# Patient Record
Sex: FEMALE | Race: WHITE | NOT HISPANIC OR LATINO | Employment: STUDENT | URBAN - METROPOLITAN AREA
[De-identification: names, ages, dates, MRNs, and addresses within clinical notes are randomized per-mention and may not be internally consistent; named-entity substitution may affect disease eponyms.]

---
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
## 2023-09-09 ENCOUNTER — HOSPITAL ENCOUNTER (EMERGENCY)
Facility: HOSPITAL | Age: 18
Discharge: PSYCHIATRIC OR SUBSTANCE USE FACILITY | End: 2023-09-10
Attending: FAMILY MEDICINE
Payer: MEDICAID

## 2023-09-09 DIAGNOSIS — U07.1 COVID: ICD-10-CM

## 2023-09-09 DIAGNOSIS — F41.0 PANIC ATTACK: Primary | ICD-10-CM

## 2023-09-09 LAB
ALBUMIN SERPL-MCNC: 4.8 G/DL (ref 3.5–5)
ALBUMIN/GLOB SERPL: 1.4 RATIO (ref 1.1–2)
ALP SERPL-CCNC: 97 UNIT/L (ref 40–150)
ALT SERPL-CCNC: 10 UNIT/L (ref 0–55)
AMPHET UR QL SCN: NEGATIVE
APAP SERPL-MCNC: <17.4 UG/ML (ref 17.4–30)
APPEARANCE UR: CLEAR
AST SERPL-CCNC: 20 UNIT/L (ref 5–34)
B-HCG SERPL QL: NEGATIVE
BACTERIA #/AREA URNS AUTO: ABNORMAL /HPF
BARBITURATE SCN PRESENT UR: NEGATIVE
BASOPHILS # BLD AUTO: 0.02 X10(3)/MCL
BASOPHILS NFR BLD AUTO: 0.5 %
BENZODIAZ UR QL SCN: NEGATIVE
BILIRUB SERPL-MCNC: 0.4 MG/DL
BILIRUB UR QL STRIP.AUTO: NEGATIVE
BUN SERPL-MCNC: 6.3 MG/DL (ref 7–18.7)
CALCIUM SERPL-MCNC: 10.1 MG/DL (ref 8.4–10.2)
CANNABINOIDS UR QL SCN: NEGATIVE
CHLORIDE SERPL-SCNC: 104 MMOL/L (ref 98–107)
CO2 SERPL-SCNC: 25 MMOL/L (ref 22–29)
COCAINE UR QL SCN: NEGATIVE
COLOR UR: YELLOW
CREAT SERPL-MCNC: 0.71 MG/DL (ref 0.55–1.02)
EOSINOPHIL # BLD AUTO: 0.01 X10(3)/MCL (ref 0–0.9)
EOSINOPHIL NFR BLD AUTO: 0.3 %
ERYTHROCYTE [DISTWIDTH] IN BLOOD BY AUTOMATED COUNT: 12.3 % (ref 11.5–17)
ETHANOL SERPL-MCNC: <10 MG/DL
FLUAV AG UPPER RESP QL IA.RAPID: NOT DETECTED
FLUBV AG UPPER RESP QL IA.RAPID: NOT DETECTED
GFR SERPLBLD CREATININE-BSD FMLA CKD-EPI: >60 MLS/MIN/1.73/M2
GLOBULIN SER-MCNC: 3.5 GM/DL (ref 2.4–3.5)
GLUCOSE SERPL-MCNC: 114 MG/DL (ref 74–100)
GLUCOSE UR QL STRIP.AUTO: NEGATIVE
HCT VFR BLD AUTO: 47.8 % (ref 37–47)
HGB BLD-MCNC: 15.4 G/DL (ref 12–16)
IMM GRANULOCYTES # BLD AUTO: 0 X10(3)/MCL (ref 0–0.04)
IMM GRANULOCYTES NFR BLD AUTO: 0 %
KETONES UR QL STRIP.AUTO: 15
LEUKOCYTE ESTERASE UR QL STRIP.AUTO: NEGATIVE
LYMPHOCYTES # BLD AUTO: 1.44 X10(3)/MCL (ref 0.6–4.6)
LYMPHOCYTES NFR BLD AUTO: 38.3 %
MCH RBC QN AUTO: 28.2 PG (ref 27–31)
MCHC RBC AUTO-ENTMCNC: 32.2 G/DL (ref 33–36)
MCV RBC AUTO: 87.5 FL (ref 80–94)
MONOCYTES # BLD AUTO: 0.46 X10(3)/MCL (ref 0.1–1.3)
MONOCYTES NFR BLD AUTO: 12.2 %
NEUTROPHILS # BLD AUTO: 1.83 X10(3)/MCL (ref 2.1–9.2)
NEUTROPHILS NFR BLD AUTO: 48.7 %
NITRITE UR QL STRIP.AUTO: NEGATIVE
OPIATES UR QL SCN: NEGATIVE
PCP UR QL: NEGATIVE
PH UR STRIP.AUTO: 7 [PH]
PH UR: 7 [PH] (ref 3–11)
PLATELET # BLD AUTO: 161 X10(3)/MCL (ref 130–400)
PMV BLD AUTO: 9.7 FL (ref 7.4–10.4)
POTASSIUM SERPL-SCNC: 3.6 MMOL/L (ref 3.5–5.1)
PROT SERPL-MCNC: 8.3 GM/DL (ref 6.4–8.3)
PROT UR QL STRIP.AUTO: NEGATIVE
RBC # BLD AUTO: 5.46 X10(6)/MCL (ref 4.2–5.4)
RBC #/AREA URNS AUTO: ABNORMAL /HPF
RBC UR QL AUTO: ABNORMAL
SALICYLATES SERPL-MCNC: <5 MG/DL (ref 15–30)
SARS-COV-2 RNA RESP QL NAA+PROBE: DETECTED
SODIUM SERPL-SCNC: 140 MMOL/L (ref 136–145)
SP GR UR STRIP.AUTO: 1.02 (ref 1–1.03)
SQUAMOUS #/AREA URNS AUTO: ABNORMAL /HPF
TSH SERPL-ACNC: 1.51 UIU/ML (ref 0.35–4.94)
UROBILINOGEN UR STRIP-ACNC: 4
WBC # SPEC AUTO: 3.76 X10(3)/MCL (ref 4.5–11.5)
WBC #/AREA URNS AUTO: ABNORMAL /HPF

## 2023-09-09 PROCEDURE — 81001 URINALYSIS AUTO W/SCOPE: CPT | Mod: 59 | Performed by: FAMILY MEDICINE

## 2023-09-09 PROCEDURE — 96372 THER/PROPH/DIAG INJ SC/IM: CPT | Performed by: FAMILY MEDICINE

## 2023-09-09 PROCEDURE — 82077 ASSAY SPEC XCP UR&BREATH IA: CPT | Performed by: FAMILY MEDICINE

## 2023-09-09 PROCEDURE — 96372 THER/PROPH/DIAG INJ SC/IM: CPT | Performed by: STUDENT IN AN ORGANIZED HEALTH CARE EDUCATION/TRAINING PROGRAM

## 2023-09-09 PROCEDURE — 99285 EMERGENCY DEPT VISIT HI MDM: CPT | Mod: 25

## 2023-09-09 PROCEDURE — 63600175 PHARM REV CODE 636 W HCPCS: Performed by: STUDENT IN AN ORGANIZED HEALTH CARE EDUCATION/TRAINING PROGRAM

## 2023-09-09 PROCEDURE — 80143 DRUG ASSAY ACETAMINOPHEN: CPT | Performed by: FAMILY MEDICINE

## 2023-09-09 PROCEDURE — 84443 ASSAY THYROID STIM HORMONE: CPT | Performed by: FAMILY MEDICINE

## 2023-09-09 PROCEDURE — 80053 COMPREHEN METABOLIC PANEL: CPT | Performed by: FAMILY MEDICINE

## 2023-09-09 PROCEDURE — 93010 EKG 12-LEAD: ICD-10-PCS | Mod: ,,, | Performed by: STUDENT IN AN ORGANIZED HEALTH CARE EDUCATION/TRAINING PROGRAM

## 2023-09-09 PROCEDURE — 0240U COVID/FLU A&B PCR: CPT | Performed by: FAMILY MEDICINE

## 2023-09-09 PROCEDURE — 93010 ELECTROCARDIOGRAM REPORT: CPT | Mod: ,,, | Performed by: STUDENT IN AN ORGANIZED HEALTH CARE EDUCATION/TRAINING PROGRAM

## 2023-09-09 PROCEDURE — 80307 DRUG TEST PRSMV CHEM ANLYZR: CPT | Performed by: FAMILY MEDICINE

## 2023-09-09 PROCEDURE — 93005 ELECTROCARDIOGRAM TRACING: CPT

## 2023-09-09 PROCEDURE — 81025 URINE PREGNANCY TEST: CPT | Performed by: FAMILY MEDICINE

## 2023-09-09 PROCEDURE — 63600175 PHARM REV CODE 636 W HCPCS: Performed by: FAMILY MEDICINE

## 2023-09-09 PROCEDURE — 80179 DRUG ASSAY SALICYLATE: CPT | Performed by: FAMILY MEDICINE

## 2023-09-09 PROCEDURE — 85025 COMPLETE CBC W/AUTO DIFF WBC: CPT | Performed by: FAMILY MEDICINE

## 2023-09-09 RX ORDER — LORATADINE 10 MG/1
10 TABLET ORAL DAILY
Status: ON HOLD | COMMUNITY
End: 2023-10-13

## 2023-09-09 RX ORDER — LISINOPRIL 5 MG/1
5 TABLET ORAL DAILY
Status: ON HOLD | COMMUNITY
End: 2023-10-13 | Stop reason: SDUPTHER

## 2023-09-09 RX ORDER — ZIPRASIDONE MESYLATE 20 MG/ML
20 INJECTION, POWDER, LYOPHILIZED, FOR SOLUTION INTRAMUSCULAR
Status: COMPLETED | OUTPATIENT
Start: 2023-09-09 | End: 2023-09-09

## 2023-09-09 RX ORDER — RISPERIDONE 1 MG/1
1 TABLET ORAL NIGHTLY
Status: ON HOLD | COMMUNITY
End: 2023-10-13 | Stop reason: SDUPTHER

## 2023-09-09 RX ORDER — LORAZEPAM 2 MG/ML
2 INJECTION INTRAMUSCULAR
Status: COMPLETED | OUTPATIENT
Start: 2023-09-09 | End: 2023-09-09

## 2023-09-09 RX ORDER — LAMOTRIGINE 100 MG/1
100 TABLET ORAL DAILY
Status: ON HOLD | COMMUNITY
End: 2023-10-13 | Stop reason: SDUPTHER

## 2023-09-09 RX ORDER — CLONIDINE HYDROCHLORIDE 0.1 MG/1
0.1 TABLET ORAL NIGHTLY
Status: ON HOLD | COMMUNITY
End: 2023-10-13

## 2023-09-09 RX ADMIN — ZIPRASIDONE MESYLATE 20 MG: 20 INJECTION, POWDER, LYOPHILIZED, FOR SOLUTION INTRAMUSCULAR at 08:09

## 2023-09-09 RX ADMIN — LORAZEPAM 2 MG: 2 INJECTION INTRAMUSCULAR; INTRAVENOUS at 02:09
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
## 2023-09-09 NOTE — ED NOTES
[facility] CENTRAL INTAKE CONTACTED ABOUT UPDATE ON PT PLACEMENT. CAROL STATES THAT [facility] HAS NO BEDS AT THIS TIME. THEY WILL KEEP TRYING TO FIND PLACEMENT.

## 2023-09-09 NOTE — ED NOTES
[facility] CENTRAL INTAKE CONTACTED AND SARI WAS ADVISED THAT THE CHEST X RAY AND EKG ARE DONE AND IN THE CHART.

## 2023-09-09 NOTE — ED NOTES
[facility]ST[facility] CALLED BACK AND STATED THAT [facility] IN [place] IS THE ONLY FACILITY ACCEPTING COVID PATIENTS BUT THE REQUIRE A CHEST XRAY AND EKG. DR MAZARIEGOS AWARE AND ORDERS PLACED.

## 2023-09-10 VITALS
HEART RATE: 109 BPM | OXYGEN SATURATION: 99 % | RESPIRATION RATE: 16 BRPM | TEMPERATURE: 98 F | DIASTOLIC BLOOD PRESSURE: 80 MMHG | WEIGHT: 100 LBS | SYSTOLIC BLOOD PRESSURE: 123 MMHG

## 2023-09-10 NOTE — ED NOTES
Patient standing near doorway of her room hollering and crying that she want to go home, she want her boyfriend, and she does not want help. Explained to patient that she have a PEC in place for her safety and will have to go to a facility to be evaluated by a specialist that would be able to help her. She insist on going home. Patient verbally redirected several times.

## 2023-09-10 NOTE — ED NOTES
[facility] called with update [place] is the only facility accepting COVID patients may have a bed available Monday morning.

## 2023-10-10 ENCOUNTER — HOSPITAL ENCOUNTER (EMERGENCY)
Facility: HOSPITAL | Age: 18
Discharge: PSYCHIATRIC OR SUBSTANCE USE FACILITY | End: 2023-10-11
Attending: SPECIALIST
Payer: MEDICAID

## 2023-10-10 DIAGNOSIS — F32.A DEPRESSION, UNSPECIFIED DEPRESSION TYPE: ICD-10-CM

## 2023-10-10 DIAGNOSIS — R45.851 SUICIDAL IDEATIONS: Primary | ICD-10-CM

## 2023-10-10 LAB
ALBUMIN SERPL-MCNC: 4.4 G/DL (ref 3.5–5)
ALBUMIN/GLOB SERPL: 1.4 RATIO (ref 1.1–2)
ALP SERPL-CCNC: 92 UNIT/L (ref 40–150)
ALT SERPL-CCNC: 59 UNIT/L (ref 0–55)
AMPHET UR QL SCN: NEGATIVE
APAP SERPL-MCNC: <17.4 UG/ML (ref 17.4–30)
APPEARANCE UR: CLEAR
AST SERPL-CCNC: 25 UNIT/L (ref 5–34)
B-HCG SERPL QL: NEGATIVE
BACTERIA #/AREA URNS AUTO: ABNORMAL /HPF
BARBITURATE SCN PRESENT UR: NEGATIVE
BASOPHILS # BLD AUTO: 0.02 X10(3)/MCL
BASOPHILS NFR BLD AUTO: 0.3 %
BENZODIAZ UR QL SCN: NEGATIVE
BILIRUB SERPL-MCNC: 0.6 MG/DL
BILIRUB UR QL STRIP.AUTO: NEGATIVE
BUN SERPL-MCNC: 8.2 MG/DL (ref 7–18.7)
CALCIUM SERPL-MCNC: 9.3 MG/DL (ref 8.4–10.2)
CANNABINOIDS UR QL SCN: NEGATIVE
CHLORIDE SERPL-SCNC: 107 MMOL/L (ref 98–107)
CO2 SERPL-SCNC: 24 MMOL/L (ref 22–29)
COCAINE UR QL SCN: NEGATIVE
COLOR UR AUTO: YELLOW
CREAT SERPL-MCNC: 0.67 MG/DL (ref 0.55–1.02)
EOSINOPHIL # BLD AUTO: 0.04 X10(3)/MCL (ref 0–0.9)
EOSINOPHIL NFR BLD AUTO: 0.5 %
ERYTHROCYTE [DISTWIDTH] IN BLOOD BY AUTOMATED COUNT: 13 % (ref 11.5–17)
ETHANOL SERPL-MCNC: <10 MG/DL
FLUAV AG UPPER RESP QL IA.RAPID: NOT DETECTED
FLUBV AG UPPER RESP QL IA.RAPID: NOT DETECTED
GFR SERPLBLD CREATININE-BSD FMLA CKD-EPI: >60 MLS/MIN/1.73/M2
GLOBULIN SER-MCNC: 3.2 GM/DL (ref 2.4–3.5)
GLUCOSE SERPL-MCNC: 97 MG/DL (ref 74–100)
GLUCOSE UR QL STRIP.AUTO: NEGATIVE
HCT VFR BLD AUTO: 40.2 % (ref 37–47)
HGB BLD-MCNC: 13.5 G/DL (ref 12–16)
IMM GRANULOCYTES # BLD AUTO: 0.01 X10(3)/MCL (ref 0–0.04)
IMM GRANULOCYTES NFR BLD AUTO: 0.1 %
KETONES UR QL STRIP.AUTO: NEGATIVE
LEUKOCYTE ESTERASE UR QL STRIP.AUTO: NEGATIVE
LYMPHOCYTES # BLD AUTO: 1.73 X10(3)/MCL (ref 0.6–4.6)
LYMPHOCYTES NFR BLD AUTO: 22.8 %
MCH RBC QN AUTO: 29.9 PG (ref 27–31)
MCHC RBC AUTO-ENTMCNC: 33.6 G/DL (ref 33–36)
MCV RBC AUTO: 88.9 FL (ref 80–94)
MONOCYTES # BLD AUTO: 0.6 X10(3)/MCL (ref 0.1–1.3)
MONOCYTES NFR BLD AUTO: 7.9 %
NEUTROPHILS # BLD AUTO: 5.18 X10(3)/MCL (ref 2.1–9.2)
NEUTROPHILS NFR BLD AUTO: 68.4 %
NITRITE UR QL STRIP.AUTO: NEGATIVE
OPIATES UR QL SCN: NEGATIVE
PCP UR QL: NEGATIVE
PH UR STRIP.AUTO: 5.5 [PH]
PH UR: 5.5 [PH] (ref 3–11)
PLATELET # BLD AUTO: 211 X10(3)/MCL (ref 130–400)
PMV BLD AUTO: 9.3 FL (ref 7.4–10.4)
POTASSIUM SERPL-SCNC: 3.8 MMOL/L (ref 3.5–5.1)
PROT SERPL-MCNC: 7.6 GM/DL (ref 6.4–8.3)
PROT UR QL STRIP.AUTO: NEGATIVE
RBC # BLD AUTO: 4.52 X10(6)/MCL (ref 4.2–5.4)
RBC #/AREA URNS AUTO: ABNORMAL /HPF
RBC UR QL AUTO: ABNORMAL
SARS-COV-2 RNA RESP QL NAA+PROBE: NOT DETECTED
SODIUM SERPL-SCNC: 140 MMOL/L (ref 136–145)
SP GR UR STRIP.AUTO: 1.01 (ref 1–1.03)
SPECIFIC GRAVITY, URINE AUTO (.000) (OHS): 1.01 (ref 1–1.03)
SQUAMOUS #/AREA URNS AUTO: ABNORMAL /HPF
TSH SERPL-ACNC: 0.98 UIU/ML (ref 0.35–4.94)
UROBILINOGEN UR STRIP-ACNC: 0.2
WBC # SPEC AUTO: 7.58 X10(3)/MCL (ref 4.5–11.5)
WBC #/AREA URNS AUTO: ABNORMAL /HPF

## 2023-10-10 PROCEDURE — 80053 COMPREHEN METABOLIC PANEL: CPT | Performed by: SPECIALIST

## 2023-10-10 PROCEDURE — 0240U COVID/FLU A&B PCR: CPT | Performed by: SPECIALIST

## 2023-10-10 PROCEDURE — 99285 EMERGENCY DEPT VISIT HI MDM: CPT

## 2023-10-10 PROCEDURE — 85025 COMPLETE CBC W/AUTO DIFF WBC: CPT | Performed by: SPECIALIST

## 2023-10-10 PROCEDURE — 81001 URINALYSIS AUTO W/SCOPE: CPT | Performed by: SPECIALIST

## 2023-10-10 PROCEDURE — 84443 ASSAY THYROID STIM HORMONE: CPT | Performed by: SPECIALIST

## 2023-10-10 PROCEDURE — 25000003 PHARM REV CODE 250: Performed by: SPECIALIST

## 2023-10-10 PROCEDURE — 80143 DRUG ASSAY ACETAMINOPHEN: CPT | Performed by: SPECIALIST

## 2023-10-10 PROCEDURE — 80307 DRUG TEST PRSMV CHEM ANLYZR: CPT | Performed by: SPECIALIST

## 2023-10-10 PROCEDURE — 82077 ASSAY SPEC XCP UR&BREATH IA: CPT | Performed by: SPECIALIST

## 2023-10-10 PROCEDURE — 81025 URINE PREGNANCY TEST: CPT | Performed by: SPECIALIST

## 2023-10-10 RX ORDER — LORAZEPAM 0.5 MG/1
1 TABLET ORAL
Status: COMPLETED | OUTPATIENT
Start: 2023-10-10 | End: 2023-10-10

## 2023-10-10 RX ADMIN — LORAZEPAM 1 MG: 0.5 TABLET ORAL at 09:10

## 2023-10-11 ENCOUNTER — HOSPITAL ENCOUNTER (INPATIENT)
Facility: HOSPITAL | Age: 18
LOS: 5 days | Discharge: HOME | DRG: 885 | End: 2023-10-16
Attending: PSYCHIATRY & NEUROLOGY | Admitting: PSYCHIATRY & NEUROLOGY
Payer: MEDICAID

## 2023-10-11 VITALS
HEIGHT: 65 IN | RESPIRATION RATE: 16 BRPM | WEIGHT: 115 LBS | SYSTOLIC BLOOD PRESSURE: 100 MMHG | TEMPERATURE: 99 F | DIASTOLIC BLOOD PRESSURE: 63 MMHG | BODY MASS INDEX: 19.16 KG/M2 | HEART RATE: 95 BPM | OXYGEN SATURATION: 98 %

## 2023-10-11 DIAGNOSIS — F32.A DEPRESSION: ICD-10-CM

## 2023-10-11 PROBLEM — I34.0 MITRAL VALVE REGURGITATION: Status: ACTIVE | Noted: 2023-10-11

## 2023-10-11 PROBLEM — R00.0 TACHYCARDIA: Status: ACTIVE | Noted: 2023-10-11

## 2023-10-11 PROBLEM — I10 HYPERTENSION: Status: ACTIVE | Noted: 2023-10-11

## 2023-10-11 PROCEDURE — 63600175 PHARM REV CODE 636 W HCPCS: Performed by: PSYCHIATRY & NEUROLOGY

## 2023-10-11 PROCEDURE — 25000003 PHARM REV CODE 250: Performed by: PSYCHIATRY & NEUROLOGY

## 2023-10-11 PROCEDURE — 11400000 HC PSYCH PRIVATE ROOM

## 2023-10-11 RX ORDER — LORAZEPAM 1 MG/1
2 TABLET ORAL EVERY 6 HOURS PRN
Status: DISCONTINUED | OUTPATIENT
Start: 2023-10-11 | End: 2023-10-16 | Stop reason: HOSPADM

## 2023-10-11 RX ORDER — HALOPERIDOL 5 MG/1
10 TABLET ORAL EVERY 4 HOURS PRN
Status: DISCONTINUED | OUTPATIENT
Start: 2023-10-11 | End: 2023-10-16 | Stop reason: HOSPADM

## 2023-10-11 RX ORDER — ACETAMINOPHEN 325 MG/1
650 TABLET ORAL EVERY 6 HOURS PRN
Status: DISCONTINUED | OUTPATIENT
Start: 2023-10-11 | End: 2023-10-16 | Stop reason: HOSPADM

## 2023-10-11 RX ORDER — DIPHENHYDRAMINE HCL 50 MG
50 CAPSULE ORAL EVERY 6 HOURS PRN
Status: DISCONTINUED | OUTPATIENT
Start: 2023-10-11 | End: 2023-10-16 | Stop reason: HOSPADM

## 2023-10-11 RX ORDER — ESCITALOPRAM OXALATE 10 MG/1
20 TABLET ORAL DAILY
Status: DISCONTINUED | OUTPATIENT
Start: 2023-10-11 | End: 2023-10-16 | Stop reason: HOSPADM

## 2023-10-11 RX ORDER — HALOPERIDOL 5 MG/ML
10 INJECTION INTRAMUSCULAR EVERY 6 HOURS PRN
Status: DISCONTINUED | OUTPATIENT
Start: 2023-10-11 | End: 2023-10-16 | Stop reason: HOSPADM

## 2023-10-11 RX ORDER — POTASSIUM CHLORIDE 20 MEQ/1
20 TABLET, EXTENDED RELEASE ORAL ONCE
Status: DISCONTINUED | OUTPATIENT
Start: 2023-10-12 | End: 2023-10-11

## 2023-10-11 RX ORDER — LAMOTRIGINE 100 MG/1
100 TABLET ORAL DAILY
Status: DISCONTINUED | OUTPATIENT
Start: 2023-10-11 | End: 2023-10-16 | Stop reason: HOSPADM

## 2023-10-11 RX ORDER — ADHESIVE BANDAGE
30 BANDAGE TOPICAL DAILY PRN
Status: DISCONTINUED | OUTPATIENT
Start: 2023-10-11 | End: 2023-10-16 | Stop reason: HOSPADM

## 2023-10-11 RX ORDER — HYDROXYZINE HYDROCHLORIDE 50 MG/1
50 TABLET, FILM COATED ORAL EVERY 6 HOURS PRN
Status: DISCONTINUED | OUTPATIENT
Start: 2023-10-11 | End: 2023-10-16 | Stop reason: HOSPADM

## 2023-10-11 RX ORDER — POTASSIUM CHLORIDE 20 MEQ/1
20 TABLET, EXTENDED RELEASE ORAL ONCE
Status: COMPLETED | OUTPATIENT
Start: 2023-10-11 | End: 2023-10-11

## 2023-10-11 RX ORDER — LORAZEPAM 2 MG/ML
2 INJECTION INTRAMUSCULAR EVERY 6 HOURS PRN
Status: DISCONTINUED | OUTPATIENT
Start: 2023-10-11 | End: 2023-10-16 | Stop reason: HOSPADM

## 2023-10-11 RX ORDER — DIPHENHYDRAMINE HYDROCHLORIDE 50 MG/ML
50 INJECTION INTRAMUSCULAR; INTRAVENOUS EVERY 6 HOURS PRN
Status: DISCONTINUED | OUTPATIENT
Start: 2023-10-11 | End: 2023-10-16 | Stop reason: HOSPADM

## 2023-10-11 RX ORDER — HALOPERIDOL 5 MG/1
10 TABLET ORAL EVERY 4 HOURS PRN
Status: DISCONTINUED | OUTPATIENT
Start: 2023-10-11 | End: 2023-10-11

## 2023-10-11 RX ORDER — LISINOPRIL 2.5 MG/1
5 TABLET ORAL DAILY
Status: DISCONTINUED | OUTPATIENT
Start: 2023-10-11 | End: 2023-10-16 | Stop reason: HOSPADM

## 2023-10-11 RX ORDER — HYDROXYZINE HYDROCHLORIDE 25 MG/1
25 TABLET, FILM COATED ORAL 3 TIMES DAILY
Status: DISCONTINUED | OUTPATIENT
Start: 2023-10-11 | End: 2023-10-16 | Stop reason: HOSPADM

## 2023-10-11 RX ORDER — TRAZODONE HYDROCHLORIDE 100 MG/1
100 TABLET ORAL NIGHTLY PRN
Status: DISCONTINUED | OUTPATIENT
Start: 2023-10-11 | End: 2023-10-16 | Stop reason: HOSPADM

## 2023-10-11 RX ADMIN — DIPHENHYDRAMINE HYDROCHLORIDE 50 MG: 50 INJECTION INTRAMUSCULAR; INTRAVENOUS at 12:10

## 2023-10-11 RX ADMIN — HYDROXYZINE HYDROCHLORIDE 25 MG: 25 TABLET, FILM COATED ORAL at 08:10

## 2023-10-11 RX ADMIN — HALOPERIDOL LACTATE 10 MG: 5 INJECTION, SOLUTION INTRAMUSCULAR at 12:10

## 2023-10-11 RX ADMIN — TRAZODONE HYDROCHLORIDE 100 MG: 100 TABLET ORAL at 08:10

## 2023-10-11 RX ADMIN — LAMOTRIGINE 100 MG: 100 TABLET ORAL at 11:10

## 2023-10-11 RX ADMIN — LORAZEPAM 2 MG: 2 INJECTION INTRAMUSCULAR; INTRAVENOUS at 12:10

## 2023-10-11 RX ADMIN — POTASSIUM CHLORIDE 20 MEQ: 1500 TABLET, EXTENDED RELEASE ORAL at 08:10

## 2023-10-11 RX ADMIN — LISINOPRIL 5 MG: 2.5 TABLET ORAL at 11:10

## 2023-10-11 RX ADMIN — ACETAMINOPHEN 650 MG: 325 TABLET, FILM COATED ORAL at 11:10

## 2023-10-11 RX ADMIN — ESCITALOPRAM OXALATE 20 MG: 10 TABLET ORAL at 11:10

## 2023-10-11 RX ADMIN — HYDROXYZINE HYDROCHLORIDE 50 MG: 50 TABLET, FILM COATED ORAL at 12:10
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
## 2023-10-11 NOTE — ED NOTES
Call placed to [facility] intake to follow up on placement; spoke with Fidel, patient accepted at [facility] by Miles Rodriguez NP call report [phone]

## 2023-10-11 NOTE — PLAN OF CARE
Problem: Adult Inpatient Plan of Care  Goal: Plan of Care Review  Outcome: Ongoing, Progressing  Goal: Patient-Specific Goal (Individualized)  Outcome: Ongoing, Progressing  Goal: Absence of Hospital-Acquired Illness or Injury  Outcome: Ongoing, Progressing  Goal: Optimal Comfort and Wellbeing  Outcome: Ongoing, Progressing  Goal: Readiness for Transition of Care  Outcome: Ongoing, Progressing     Problem: Violence Risk or Actual  Goal: Anger and Impulse Control  Outcome: Ongoing, Progressing     Problem: Activity and Energy Impairment (Depressive Signs/Symptoms)  Goal: Optimized Energy Level (Depressive Signs/Symptoms)  Outcome: Ongoing, Progressing     Problem: Cognitive Impairment (Depressive Signs/Symptoms)  Goal: Optimized Cognitive Function  Outcome: Ongoing, Progressing     Problem: Decreased Participation/Engagement (Depressive Signs/Symptoms)  Goal: Increased Participation and Engagement (Depressive Signs/Symptoms)  Outcome: Ongoing, Progressing     Problem: Feelings of Worthlessness, Hopelessness or Excessive Guilt (Depressive Signs/Symptoms)  Goal: Enhanced Self-Esteem and Confidence (Depressive Signs/Symptoms)  Outcome: Ongoing, Progressing     Problem: Mood Impairment (Depressive Signs/Symptoms)  Goal: Improved Mood Symptoms (Depressive Signs/Symptoms)  Outcome: Ongoing, Progressing     Problem: Nutrition Imbalance (Depressive Signs/Symptoms)  Goal: Optimized Nutrition Intake  Outcome: Ongoing, Progressing     Problem: Psychomotor Impairment (Depressive Signs/Symptoms)  Goal: Improved Psychomotor Symptoms (Depressive Signs/Symptoms)  Outcome: Ongoing, Progressing     Problem: Sleep Disturbance (Depressive Signs/Symptoms)  Goal: Improved Sleep (Depressive Signs/Symptoms)  Outcome: Ongoing, Progressing     Problem: Social, Occupational or Functional Impairment (Depressive Signs/Symptoms)  Goal: Enhanced Social, Occupational or Functional Skills (Depressive Signs/Symptoms)  Outcome: Ongoing,  Progressing

## 2023-10-11 NOTE — ED NOTES
[facility] is here to  the patient. Ccalled [facility] to let know patient is on the way.

## 2023-10-11 NOTE — NURSING
"PRN Administration Note:    Behavior:    Patient (Hernando Alvarado is a 17 y.o. female, : 2005, MRN: [id number])     Allergies: Patient has no known allergies.    Hernando's  height is 4' 11" (1.499 m) and weight is 45.2 kg (99 lb 9.6 oz). Her temperature is 98.3 °F (36.8 °C). Her blood pressure is 116/79 and her pulse is 101. Her respiration is 16 and oxygen saturation is 99%.     Reason for PRN Administration: Atarax 50 mg PO PRN ineffective. She is non redirectable. Combative towards staff. Screaming. Tearful.     Intervention:    Administered Ativan 2 mg IM PRN, Benadryl 50 mg IM PRN, and Haldol 10 mg IM PRN per physician order to Hernando       Response:    Hernando tolerated administration well.      Plan:     Continue to monitor per MD/PA/APRN orders; and reevaluate medication effectiveness within an hour.    "

## 2023-10-11 NOTE — NURSING
"Admission Note:    Hernando Alvarado is a 17 y.o. female, : 2005, MRN: [id number], admitted on 10/11/2023 to [facility] ([facility]) for Kody Gleason MD with a diagnosis of Depression [F32.A]. Patient admitted on a status of Physician Emergency Certificate (PEC). Hernando reports no known food or drug allergies.    Patient demonstrated an affect that was sad, flat, anxious, irritable, and  labile. Patient demonstrated mood during assessment that was depressed, anxious, and swings. Patient had an appearance that was clean.  Patient endorses suicidal ideation. Patient denies suicide plan. Patient denies hallucinations.    Hernando's  height is 4' 11" (1.499 m) and weight is 45.2 kg (99 lb 9.6 oz). Her temperature is 98.8 °F (37.1 °C). Her blood pressure is 125/68 and her pulse is 101. Her respiration is 22 (abnormal).     Hernando's last BM was noted on: 10/10/23.    Metal detector screening performed via security personnel. The result of the scan was no contraband found. Head-to-toe physical assessment completed with the following findings: no wounds  found upon body screen. A full skin assessment was performed. Hernando's skin appeared warm, dry, and intact. Hernando was oriented to unit, staff, peers, and room. Patient belongings/valuables stored in locked intake room cabinet and changes of clothing provided to patient. Hernando was placed on Q 15 min observations.       "

## 2023-10-11 NOTE — H&P
"10/11/2023  Hernando Alvarado   2005   [id number]            Psychiatry Inpatient Admission Note    Date of Admission: 10/11/2023  7:10 AM    Current Legal Status: Physician's Emergency Certificate    Chief Complaint: "They left me at my mom's"    SUBJECTIVE:   History of Present Illness:   Hernando Alvarado is a 17 y.o. female placed under a PEC at [facility] due to attempting to ingest a bottle of her medication but family was apparently able to take them from her before she could ingest anything.    Her boyfriend recently breaking up with her was the precipitating stressor.  Tearful during evaluation.  It is actually difficult to get her to answer questions due to the amount of tearfulness.  After the argument with her boyfriend, boyfriend's mother dropped her off at patient's mother's house (patient was living with boyfriend), locked the car door, and drove off.  This also occurred prior to her inpatient stay 3 weeks ago in [place].    Was recently in an inpatient unit roughly 3 weeks ago in [place].  She was first put on psychiatric medication 3 months ago.  States that, at the time, she was "tripping" because she did not want to stay at her nanny's house.  States that she was "seeing things on the phone or TV when she is watching them" during that period.  However, no overt signs/symptoms of psychosis now.  On Lamictal, Risperdal, Lexapro, Vistaril, Claritin, Lisinopril, Clonidine, and a Depo shot.  Discussed adjusting some of these medications, to which she is amenable.      Of note, records review shows ED visits in 2017 with concern for "manic state" after being stopped on Risperdal.  Will continue Risperdal for now.    UDS: (-)  Blood alcohol: <10      Past Psychiatric History:   Previous Psychiatric Hospitalizations: 1 prior inpatient stay   Previous Medication Trials: Yes  Previous Suicide Attempts: Denies   Outpatient psychiatrist: None, but followed by Lupe Kaiser NP    Past " Medical/Surgical History:   Past Medical History:   Diagnosis Date    ADHD     Depression     Hypertension     Mitral valve regurgitation      No past surgical history on file.      Family Psychiatric History:   Sister - patient unsure of diagnosis     Allergies:   Review of patient's allergies indicates:  No Known Allergies    Substance Abuse History:   Tobacco: Denies  Alcohol: Denies  Illicit Substances: Denies  Treatment: N/a      Current Medications:   Home Psychiatric Meds: Lexapro 10mg daily, Lamictal 100mg daily, Risperdal 1.5mg HS, Vistaril 25mg TID, Lisinopril 5mg daily, Clonidine 0.1mg daily (should have been otu for 1 month)    Depo shot    Scheduled Meds:    PRN Meds: acetaminophen, diphenhydrAMINE, diphenhydrAMINE, haloperidol lactate, haloperidoL, hydrOXYzine HCL, lorazepam, LORazepam, magnesium hydroxide 400 mg/5 ml, traZODone   Psychotherapeutics (From admission, onward)      Start     Stop Route Frequency Ordered    10/11/23 0823  haloperidoL tablet 10 mg         -- Oral Every 4 hours PRN 10/11/23 0724    10/11/23 0720  LORazepam injection 2 mg         -- IM Every 6 hours PRN 10/11/23 0720    10/11/23 0720  LORazepam tablet 2 mg         -- Oral Every 6 hours PRN 10/11/23 0720    10/11/23 0720  traZODone tablet 100 mg         -- Oral Nightly PRN 10/11/23 0720    10/11/23 0720  haloperidol lactate injection 10 mg         -- IM Every 6 hours PRN 10/11/23 0720              Social History:  Housing Status: Was living with her boyfriend in [place].  Mother live [place]  Relationship Status/Sexual Orientation: Never    Children: None  Education: High school graduate   Employment Status/Info: Financially supported by family    history: None  History of physical/sexual abuse: Denies   Access to gun: Denies       Legal History:   Past Charges/Incarcerations: Denies   Pending charges: Denies      OBJECTIVE:   Medical Review Of Systems:  Constitutional: negative  Respiratory:  negative  Cardiovascular: negative  Gastrointestinal: negative  Genitourinary:negative  Musculoskeletal:negative  Neurological: negative     Vitals   Vitals:    10/11/23 0710   BP: 125/68   Pulse: 101   Resp: (!) 22   Temp: 98.8 °F (37.1 °C)        Labs/Imaging/Studies:   Recent Results (from the past 48 hour(s))   Comprehensive metabolic panel    Collection Time: 10/10/23  9:15 PM   Result Value Ref Range    Sodium Level 140 136 - 145 mmol/L    Potassium Level 3.8 3.5 - 5.1 mmol/L    Chloride 107 98 - 107 mmol/L    Carbon Dioxide 24 22 - 29 mmol/L    Glucose Level 97 74 - 100 mg/dL    Blood Urea Nitrogen 8.2 7.0 - 18.7 mg/dL    Creatinine 0.67 0.55 - 1.02 mg/dL    Calcium Level Total 9.3 8.4 - 10.2 mg/dL    Protein Total 7.6 6.4 - 8.3 gm/dL    Albumin Level 4.4 3.5 - 5.0 g/dL    Globulin 3.2 2.4 - 3.5 gm/dL    Albumin/Globulin Ratio 1.4 1.1 - 2.0 ratio    Bilirubin Total 0.6 <=1.5 mg/dL    Alkaline Phosphatase 92 40 - 150 unit/L    Alanine Aminotransferase 59 (H) 0 - 55 unit/L    Aspartate Aminotransferase 25 5 - 34 unit/L    eGFR >60 mls/min/1.73/m2   TSH    Collection Time: 10/10/23  9:15 PM   Result Value Ref Range    TSH 0.977 0.350 - 4.940 uIU/mL   Ethanol    Collection Time: 10/10/23  9:15 PM   Result Value Ref Range    Ethanol Level <10.0 <=10.0 mg/dL   Acetaminophen level    Collection Time: 10/10/23  9:15 PM   Result Value Ref Range    Acetaminophen Level <17.4 (L) 17.4 - 30.0 ug/ml   CBC with Differential    Collection Time: 10/10/23  9:15 PM   Result Value Ref Range    WBC 7.58 4.50 - 11.50 x10(3)/mcL    RBC 4.52 4.20 - 5.40 x10(6)/mcL    Hgb 13.5 12.0 - 16.0 g/dL    Hct 40.2 37.0 - 47.0 %    MCV 88.9 80.0 - 94.0 fL    MCH 29.9 27.0 - 31.0 pg    MCHC 33.6 33.0 - 36.0 g/dL    RDW 13.0 11.5 - 17.0 %    Platelet 211 130 - 400 x10(3)/mcL    MPV 9.3 7.4 - 10.4 fL    Neut % 68.4 %    Lymph % 22.8 %    Mono % 7.9 %    Eos % 0.5 %    Basophil % 0.3 %    Lymph # 1.73 0.6 - 4.6 x10(3)/mcL    Neut # 5.18 2.1 - 9.2  "x10(3)/mcL    Mono # 0.60 0.1 - 1.3 x10(3)/mcL    Eos # 0.04 0 - 0.9 x10(3)/mcL    Baso # 0.02 <=0.2 x10(3)/mcL    IG# 0.01 0 - 0.04 x10(3)/mcL    IG% 0.1 %   Urinalysis, Reflex to Urine Culture    Collection Time: 10/10/23  9:21 PM    Specimen: Urine   Result Value Ref Range    Color, UA Yellow Yellow, Light-Yellow, Dark Yellow, [place], Straw    Appearance, UA Clear Clear    Specific Gravity, UA 1.015 1.005 - 1.030    pH, UA 5.5 5.0 - 8.5    Protein, UA Negative Negative    Glucose, UA Negative Negative, Normal    Ketones, UA Negative Negative    Blood, UA Trace-Lysed (A) Negative    Bilirubin, UA Negative Negative    Urobilinogen, UA 0.2 0.2, 1.0, Normal    Nitrites, UA Negative Negative    Leukocyte Esterase, UA Negative Negative   Drug Screen, Urine    Collection Time: 10/10/23  9:21 PM   Result Value Ref Range    Amphetamines, Urine Negative Negative    Barbituates, Urine Negative Negative    Benzodiazepine, Urine Negative Negative    Cannabinoids, Urine Negative Negative    Cocaine, Urine Negative Negative    Opiates, Urine Negative Negative    Phencyclidine, Urine Negative Negative    pH, Urine 5.5 3.0 - 11.0    Specific Gravity, Urine Auto 1.015 1.001 - 1.035   COVID/FLU A&B PCR    Collection Time: 10/10/23  9:21 PM   Result Value Ref Range    Influenza A PCR Not Detected Not Detected    Influenza B PCR Not Detected Not Detected    SARS-CoV-2 PCR Not Detected Not Detected, Negative, Invalid   Pregnancy, urine rapid    Collection Time: 10/10/23  9:21 PM   Result Value Ref Range    Beta hCG Qualitative, Urine Negative Negative   Urinalysis, Microscopic    Collection Time: 10/10/23  9:21 PM   Result Value Ref Range    Bacteria, UA None Seen None Seen, Rare, Occasional /HPF    RBC, UA None Seen None Seen, 0-2, 3-5, 0-5 /HPF    WBC, UA None Seen None Seen, 0-2, 3-5, 0-5 /HPF    Squamous Epithelial Cells, UA Many (A) None Seen, Rare, Occasional, Occ /HPF      No results found for: "PHENYTOIN", "PHENOBARB", " ""VALPROATE", "CBMZ"        Psychiatric Mental Status Exam:  General Appearance: appears stated age, well-developed, well-nourished  Arousal: alert  Behavior: tearful  Movements and Motor Activity: no abnormal involuntary movements noted  Orientation: oriented to person, place, time, and situation  Speech: soft-spoken  Mood: Depressed  Affect: reactive, tearful  Thought Process: linear, delayed  Associations: intact  Thought Content and Perceptions: (+)suicidal ideation, no homicidal ideation, no auditory hallucinations, no visual hallucinations, no paranoid ideation, no ideas of reference  Recent and Remote Memory: fair; per interview/observation with patient  Attention and Concentration: limited; per interview/observation with patient  Fund of Knowledge: limited; based on history, vocabulary, fund of knowledge, syntax, grammar, and content  Insight: limited; based on understanding of severity of illness and HPI  Judgment: limited; based on patient's behavior and HPI       Patient Strengths:  Access to care and Able to verbalize needs      Patient Liabilities:  Depression and Relationship stressors      Discharge Criteria:  Improved mood, Medication compliance, Overall functional improvement, and Improved coping skills      Reason for Admission:  The patient poses a significant and immediate danger to self., The psychiatric disorder requires intensive treatment that necessitates 24 hour observation and care., and The patient can demonstrate a reasonable expectation of improvement in his/her disorder or condition as a result of active treatment being provided.    ASSESSMENT/PLAN:   Diagnoses:  Bipolar Disorder, most recent episode depressed, severe (F31.4)  Generalized Anxiety Disorder (F41.1)  Intellectual Disability (F79)    Hypertension    R/o Bipolar Disorder    Past Medical History:   Diagnosis Date    ADHD     Depression     Hypertension     Mitral valve regurgitation           Problem lists and Management " Plans:  -Admit to [facility]  -Will attempt to obtain outside psychiatric records if available  - to assist with aftercare planning and collateral  -Continue inpatient treatment as evidenced by danger to self    Bipolar disorder, acute  -Continue Risperdal and Lamictal    Anxiety, acute  -Increase Lexapro to 20mg daily    Intellectual disability  -Group/Individual psychotherapy        Estimated length of stay: 5-7 days    Estimated Disposition: Home    Estimated Follow-up: Outpatient medication management      On this date, I have reviewed the medical history and Nursing Assessment, as well as records from referral source.  I have evaluated the mental status of the above named person and concur with the findings of all assessments.  I have provided medical direction for the development of the Treatment Plan.    I conclude that this patient meets admission criteria for inpatient treatment.  I certify that this patient poses a danger to self or others, or would otherwise be considered gravely disabled based on this assessment and/or provided collateral information.     I have provided medical direction for the development of the Treatment plan.  These services will be provided while this patient is under my care and will be based on an individualized plan of care.  The patient can demonstrate a reasonable expectation of improvement in his/her disorder as a result of the active treatment being provided.      Kody Gleason M.D.

## 2023-10-11 NOTE — ED NOTES
Here with mother who states she was kicked out of her living situation a few times this month due to persistent depression and crying. States that no one wants to deal with her. Patient tried to take a bottle of pills however was stopped before she could put in mouth. Patient is in the room crying. Mother states she is always like this. MD assessing patient.
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
## 2023-10-11 NOTE — NURSING
"PRN Administration Note:    Behavior:    Patient (Hernando Alvarado is a 17 y.o. female, : 2005, MRN: [id number])     Allergies: Patient has no known allergies.    Hernando's  height is 4' 11" (1.499 m) and weight is 45.2 kg (99 lb 9.6 oz). Her temperature is 98.3 °F (36.8 °C). Her blood pressure is 116/79 and her pulse is 101. Her respiration is 16 and oxygen saturation is 99%.     Reason for PRN Administration: anxiety.    Intervention:    Administered Atarax 50 mg PO PRN per physician order to Hernando       Response:    Hernando tolerated administration well.      Plan:     Continue to monitor per MD/PA/APRN orders; and reevaluate medication effectiveness within 30 minutes.    "
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
## 2023-10-11 NOTE — NURSING
Treatment Team Note:      Behavior:    Patient (Hernando Alvarado is a 17 y.o. female, : 2005, MRN: [id number]) demonstrating an affect that was sad, flat, tearful, anxious, irritable, and  labile. Hernando demonstrating mood that is depressed, anxious, and swings. Hernando had an appearance that was clean. Hernando endorses suicidal ideation. Hernando endorses suicide plan. Hernando denies hallucinations.      Intervention:    Encourage Hernando to perform self-hygiene, grooming, and changing of clothing. Encourage Hernando to attend all scheduled groups. Monitor Hernando's behavior and program compliance. Monitor Hernando for suicidal ideation, homicidal ideation, sleep disturbance, and hallucinations. Encourage Hernando to eat all portions of meals and assess for meal preferences. Monitor Hernando for intake and output to ensure hydration. Notify the Physician/Physician Assistant/Advance Practice Registered Nurse (MD/PA/APRN) for any medication refusal and any change in patient condition.    Discussed with Hernando course of treatment. Discussed with Hernando medications ordered and schedule of medications. Discussed collateral contact with Hernando.      Response:    Hernando's response to treatment team meeting: cooperative.      Plan:     Continue to monitor per MD/PA/APRN orders; maintain patient safety.

## 2023-10-11 NOTE — GROUP NOTE
Group Psychotherapy       Group Focus: Relationship Dynamics      Number of patients in attendance: 5    Group focused on situations for interpersonal effectiveness, DBT skills handout 1 was used to guide the discussion. Handouts were provided to everyone in attendance and offered to pts not in attendance.     Group Start Time: 1045  Group End Time:  1130  Groups Date: 10/11/2023  Group Topic:  Behavioral Health  Group Department: [facility] - Behavioral Health Unit  Group Facilitators:  Juana Guo SSW   _____________________________________________________________________    Patient Name: Hernando Alvarado  MRN: [id number]  Patient Class: IP- Psych   Admission Date\Time: 10/11/2023  7:10 AM  Hospital Length of Stay: 0  Primary Care Provider: No, Primary Doctor     Referred by: Acute Psychiatry Unit Treatment Team     Target symptoms: Depression     Patient's response to treatment: Active Listening and Self-disclosure     Progress toward goals: Progressing slowly     Interval History:      Diagnosis:      Plan: Continue treatment on APU    Pt participated appropriately with prompting, good eye contact during group

## 2023-10-11 NOTE — PROGRESS NOTES
"Hernando is a 17 female admitted for Bipolar Disorder, most recent episode depressed, severe, Generalized Anxiety Disorder, and Intellectual Disability with limited reading and witting skills, and  a -uds. CTRS met with PT 1:1, Hernando appeared depressed AEB crying and reporting ability to perform her ADL's. CTRS educated Pt to TR group times and dates, with Hernando agreeing to attend TR groups. Hernando reported her treatment goal as "Coping skills".     10/11/23 0858   General   Admit Date 10/11/23   Primary Diagnosis Bipolar Disorder, most recent episode depressed, severe   Secondary Diagnosis Generalized Anxiety Disorder, and Intellectual Disability   Sikh Moravian   Number of Children 0   Children Living? 0   Occupation unemployed   Does the patient have dentures? No   If you were to take part in activities, which of the following would you prefer? Both   Do you feel like you have enough to keep you busy now? Yes   Do you believe that you have the opportunity for physical activity? Yes   Activity Capabilities Moderate   Subjective   Patient states My boyfriend left me and his momma dropped me off at my mommas house, locked the door and left me   Precautions   General Precautions other (see comments)  (HTN and Cardiac)   Assessment   Mobility ambulates independently   Transfers independently   Musculoskeletal pain;other (see comments)  (c/o lower back pain)   Visual Acuity normal vision   Visual Perception depth perception;color perception;recognizes letters;recognizes numbers   Hearing normal   Speech/Communication normal   Cognitive Concerns oriented x4;problem solving;slow learning ability;unable to read;unable to write;further evaluation may determine other cognitive concerns   Emotional Concerns appears depressed;appears anxious;lability (crying);poor self image;somatic   Leisure Interest Survey   Leisure Interest Survey Yes   Social/Group Activities   Shopping Current Interest   Restaurant Current Interest "   Solitary Activities   Computer Activities Current Interest   Watching Videos Current Interest   Music Listening Current Interest   Physical Activities   Walk/Run Current Interest   Creative Activities   Singing Current Interest   Spectator Events   Movies Current Interest   Passive Games   Card Games Current Interest   Goals   Additional Documentation yes   Goal Formulation With patient   Time For Goal Achievement 7 days   Goal 1 Coping skills   Plan   Planned Therapy Intervention Group Recreational Therapy   Expected Length of Stay 5-7days   PT Frequency Minimum of 3 visits per week

## 2023-10-11 NOTE — PROGRESS NOTES
Hernando refused both TR groups despite encouragement, Alternative material was offered and refused   10/11/23 1500   [facility] Group Therapy   Group Name Therapeutic Recreation   Specific Interventions Skilled Activity Leisure Education and Awareness   Participation Level None   Participation Quality Refused

## 2023-10-11 NOTE — ED NOTES
Call placed to [facility] intake to follow up on referral for placement, Sylvia answered and reports she was transferring call to clinician, no answer

## 2023-10-11 NOTE — NURSING
"PRN Medication Follow-up Note:    Behavior:    Patient (Hernando Alvarado is a 17 y.o. female, : 2005, MRN: [id number])     Allergies: Patient has no known allergies.    Hernando's  height is 4' 11" (1.499 m) and weight is 45.2 kg (99 lb 9.6 oz). Her temperature is 98.3 °F (36.8 °C). Her blood pressure is 116/79 and her pulse is 101. Her respiration is 16 and oxygen saturation is 99%.     Administered Ativan 2 mg IM PRN, Haldol 10 mg IM PRN, and Benadryl 50 mg IM per physician order to Hernando       Intervention:    Intervention to Hernando's response: sleeping at present time. Decrease in agitation and anxiety.      Response:    Hernando's response: decrease in agitation and anxiety. Sleeping at present time.      Plan:     Continue to monitor per MD/PA/APRN orders; and reevaluate medication effectiveness within 30 minutes.    "

## 2023-10-12 LAB
BASOPHILS # BLD AUTO: 0.04 X10(3)/MCL
BASOPHILS NFR BLD AUTO: 0.8 %
CHOLEST SERPL-MCNC: 138 MG/DL
CHOLEST/HDLC SERPL: 2 {RATIO} (ref 0–5)
EOSINOPHIL # BLD AUTO: 0.11 X10(3)/MCL (ref 0–0.9)
EOSINOPHIL NFR BLD AUTO: 2.3 %
ERYTHROCYTE [DISTWIDTH] IN BLOOD BY AUTOMATED COUNT: 13.2 % (ref 11.5–17)
EST. AVERAGE GLUCOSE BLD GHB EST-MCNC: 88.2 MG/DL
GLUCOSE P FAST SERPL-MCNC: 83 MG/DL (ref 70–155)
HBA1C MFR BLD: 4.7 %
HCT VFR BLD AUTO: 39.4 % (ref 37–47)
HDLC SERPL-MCNC: 63 MG/DL (ref 35–60)
HGB BLD-MCNC: 12.9 G/DL (ref 12–16)
IMM GRANULOCYTES # BLD AUTO: 0.01 X10(3)/MCL (ref 0–0.04)
IMM GRANULOCYTES NFR BLD AUTO: 0.2 %
LDLC SERPL CALC-MCNC: 70 MG/DL (ref 50–140)
LYMPHOCYTES # BLD AUTO: 2.12 X10(3)/MCL (ref 0.6–4.6)
LYMPHOCYTES NFR BLD AUTO: 44.6 %
MCH RBC QN AUTO: 29.1 PG (ref 27–31)
MCHC RBC AUTO-ENTMCNC: 32.7 G/DL (ref 33–36)
MCV RBC AUTO: 88.7 FL (ref 80–94)
MONOCYTES # BLD AUTO: 0.4 X10(3)/MCL (ref 0.1–1.3)
MONOCYTES NFR BLD AUTO: 8.4 %
NEUTROPHILS # BLD AUTO: 2.07 X10(3)/MCL (ref 2.1–9.2)
NEUTROPHILS NFR BLD AUTO: 43.7 %
NRBC BLD AUTO-RTO: 0 %
PLATELET # BLD AUTO: 217 X10(3)/MCL (ref 130–400)
PMV BLD AUTO: 10.3 FL (ref 7.4–10.4)
RBC # BLD AUTO: 4.44 X10(6)/MCL (ref 4.2–5.4)
T PALLIDUM AB SER QL: NONREACTIVE
TRIGL SERPL-MCNC: 25 MG/DL (ref 37–140)
TSH SERPL-ACNC: 0.9 UIU/ML (ref 0.35–4.94)
VLDLC SERPL CALC-MCNC: 5 MG/DL
WBC # SPEC AUTO: 4.75 X10(3)/MCL (ref 4.5–11.5)

## 2023-10-12 PROCEDURE — 80061 LIPID PANEL: CPT | Performed by: PSYCHIATRY & NEUROLOGY

## 2023-10-12 PROCEDURE — 82947 ASSAY GLUCOSE BLOOD QUANT: CPT | Performed by: PSYCHIATRY & NEUROLOGY

## 2023-10-12 PROCEDURE — 25000003 PHARM REV CODE 250: Performed by: PSYCHIATRY & NEUROLOGY

## 2023-10-12 PROCEDURE — 84443 ASSAY THYROID STIM HORMONE: CPT | Performed by: PSYCHIATRY & NEUROLOGY

## 2023-10-12 PROCEDURE — 11400000 HC PSYCH PRIVATE ROOM

## 2023-10-12 PROCEDURE — 83036 HEMOGLOBIN GLYCOSYLATED A1C: CPT | Performed by: PSYCHIATRY & NEUROLOGY

## 2023-10-12 PROCEDURE — 85025 COMPLETE CBC W/AUTO DIFF WBC: CPT | Performed by: PSYCHIATRY & NEUROLOGY

## 2023-10-12 PROCEDURE — 86780 TREPONEMA PALLIDUM: CPT | Performed by: PSYCHIATRY & NEUROLOGY

## 2023-10-12 RX ORDER — MAG HYDROX/ALUMINUM HYD/SIMETH 200-200-20
30 SUSPENSION, ORAL (FINAL DOSE FORM) ORAL EVERY 6 HOURS PRN
Status: DISCONTINUED | OUTPATIENT
Start: 2023-10-12 | End: 2023-10-16 | Stop reason: HOSPADM

## 2023-10-12 RX ORDER — MAG HYDROX/ALUMINUM HYD/SIMETH 200-200-20
30 SUSPENSION, ORAL (FINAL DOSE FORM) ORAL
Status: DISCONTINUED | OUTPATIENT
Start: 2023-10-12 | End: 2023-10-12

## 2023-10-12 RX ADMIN — HYDROXYZINE HYDROCHLORIDE 25 MG: 25 TABLET, FILM COATED ORAL at 03:10

## 2023-10-12 RX ADMIN — TRAZODONE HYDROCHLORIDE 100 MG: 100 TABLET ORAL at 08:10

## 2023-10-12 RX ADMIN — HYDROXYZINE HYDROCHLORIDE 25 MG: 25 TABLET, FILM COATED ORAL at 08:10

## 2023-10-12 RX ADMIN — ESCITALOPRAM OXALATE 20 MG: 10 TABLET ORAL at 08:10

## 2023-10-12 RX ADMIN — HYDROXYZINE HYDROCHLORIDE 50 MG: 50 TABLET, FILM COATED ORAL at 05:10

## 2023-10-12 RX ADMIN — LISINOPRIL 5 MG: 2.5 TABLET ORAL at 08:10

## 2023-10-12 RX ADMIN — LAMOTRIGINE 100 MG: 100 TABLET ORAL at 08:10

## 2023-10-12 NOTE — H&P
[facility] - [facility]al [facility] Unit  History & Physical    Subjective:      No chief complaint on file.       HPI:  Hernando Alvarado is a 17 y.o. female.    I have been very depressed  and thought about killing myself by overdosing. My boyfriend broke up with me. Denies hallucinations or street drugs.  I have been in a psychiatric facility twice before.  All for depression. I have A LOT of mood swings. I was told I have bipolar depression.    Past Medical History:   Diagnosis Date    ADHD     Depression     Hypertension     Mitral valve regurgitation      History reviewed. No pertinent surgical history.  Family History   Problem Relation Age of Onset    Diabetes Mellitus Mother     Hypertension Mother     Heart attack Paternal Grandfather      Social History     Tobacco Use    Smoking status: Never    Smokeless tobacco: Never   Substance Use Topics    Alcohol use: Never    Drug use: Never       PTA Medications   Medication Sig    cloNIDine (CATAPRES) 0.1 MG tablet Take 0.1 mg by mouth every evening.    lamoTRIgine (LAMICTAL) 100 MG tablet Take 100 mg by mouth once daily.    lisinopriL (PRINIVIL,ZESTRIL) 5 MG tablet Take 5 mg by mouth once daily.    loratadine (CLARITIN) 10 mg tablet Take 10 mg by mouth once daily.    risperiDONE (RISPERDAL) 1 MG tablet Take 1 mg by mouth every evening.     Review of patient's allergies indicates:  No Known Allergies     Review of Systems   Constitutional: Negative.    HENT: Negative.     Respiratory: Negative.     Cardiovascular: Negative.    Gastrointestinal: Negative.    Genitourinary: Negative.    Musculoskeletal: Negative.    Skin: Negative.    Neurological: Negative.    Endo/Heme/Allergies: Negative.    Psychiatric/Behavioral:  Positive for depression and suicidal ideas. Negative for hallucinations and substance abuse. The patient is nervous/anxious and has insomnia.        Objective:      Vital Signs (Most Recent)  Temp: 98.8 °F (37.1 °C) (10/11/23 1615)  Pulse:  (!) 118 (10/11/23 1615)  Resp: 18 (10/11/23 1615)  BP: 112/75 (10/11/23 1615)  SpO2: 97 % (10/11/23 1615)    Vital Signs Range (Last 24H):  Temp:  [98.2 °F (36.8 °C)-98.8 °F (37.1 °C)]   Pulse:  []   Resp:  [16-22]   BP: (100-143)/(63-85)   SpO2:  [97 %-99 %]     Physical Exam  Vitals reviewed. Exam conducted with a chaperone present.   Constitutional:       Interventions: She is sedated.      Comments: Pt had injection.   HENT:      Head: Normocephalic.      Nose: Nose normal.      Mouth/Throat:      Mouth: Mucous membranes are moist.      Dentition: Gum lesions (pyorrhea and plaque) present.      Pharynx: Oropharynx is clear.   Eyes:      Extraocular Movements: Extraocular movements intact.      Pupils: Pupils are equal, round, and reactive to light.   Cardiovascular:      Rate and Rhythm: Regular rhythm. Tachycardia present.      Heart sounds: Murmur heard.      Systolic murmur is present with a grade of 1/6.      Comments: Apical   Pulmonary:      Effort: Pulmonary effort is normal.      Breath sounds: Normal breath sounds.   Abdominal:      General: Abdomen is flat. Bowel sounds are normal.      Palpations: Abdomen is soft.   Musculoskeletal:         General: Normal range of motion.      Cervical back: Normal range of motion and neck supple.   Skin:     General: Skin is warm and dry.   Neurological:      General: No focal deficit present.      Mental Status: She is alert.   Psychiatric:         Mood and Affect: Mood is depressed. Affect is blunt.         Behavior: Behavior is slowed.         Thought Content: Thought content includes suicidal ideation.      Comments: Very low voice. Many / most times uncomprehendible. I am soooo drowsy. They gave me a shot.         Data Review:    Recent Results (from the past 48 hour(s))   Comprehensive metabolic panel    Collection Time: 10/10/23  9:15 PM   Result Value Ref Range    Sodium Level 140 136 - 145 mmol/L    Potassium Level 3.8 3.5 - 5.1 mmol/L     Chloride 107 98 - 107 mmol/L    Carbon Dioxide 24 22 - 29 mmol/L    Glucose Level 97 74 - 100 mg/dL    Blood Urea Nitrogen 8.2 7.0 - 18.7 mg/dL    Creatinine 0.67 0.55 - 1.02 mg/dL    Calcium Level Total 9.3 8.4 - 10.2 mg/dL    Protein Total 7.6 6.4 - 8.3 gm/dL    Albumin Level 4.4 3.5 - 5.0 g/dL    Globulin 3.2 2.4 - 3.5 gm/dL    Albumin/Globulin Ratio 1.4 1.1 - 2.0 ratio    Bilirubin Total 0.6 <=1.5 mg/dL    Alkaline Phosphatase 92 40 - 150 unit/L    Alanine Aminotransferase 59 (H) 0 - 55 unit/L    Aspartate Aminotransferase 25 5 - 34 unit/L    eGFR >60 mls/min/1.73/m2   TSH    Collection Time: 10/10/23  9:15 PM   Result Value Ref Range    TSH 0.977 0.350 - 4.940 uIU/mL   Ethanol    Collection Time: 10/10/23  9:15 PM   Result Value Ref Range    Ethanol Level <10.0 <=10.0 mg/dL   Acetaminophen level    Collection Time: 10/10/23  9:15 PM   Result Value Ref Range    Acetaminophen Level <17.4 (L) 17.4 - 30.0 ug/ml   CBC with Differential    Collection Time: 10/10/23  9:15 PM   Result Value Ref Range    WBC 7.58 4.50 - 11.50 x10(3)/mcL    RBC 4.52 4.20 - 5.40 x10(6)/mcL    Hgb 13.5 12.0 - 16.0 g/dL    Hct 40.2 37.0 - 47.0 %    MCV 88.9 80.0 - 94.0 fL    MCH 29.9 27.0 - 31.0 pg    MCHC 33.6 33.0 - 36.0 g/dL    RDW 13.0 11.5 - 17.0 %    Platelet 211 130 - 400 x10(3)/mcL    MPV 9.3 7.4 - 10.4 fL    Neut % 68.4 %    Lymph % 22.8 %    Mono % 7.9 %    Eos % 0.5 %    Basophil % 0.3 %    Lymph # 1.73 0.6 - 4.6 x10(3)/mcL    Neut # 5.18 2.1 - 9.2 x10(3)/mcL    Mono # 0.60 0.1 - 1.3 x10(3)/mcL    Eos # 0.04 0 - 0.9 x10(3)/mcL    Baso # 0.02 <=0.2 x10(3)/mcL    IG# 0.01 0 - 0.04 x10(3)/mcL    IG% 0.1 %   Urinalysis, Reflex to Urine Culture    Collection Time: 10/10/23  9:21 PM    Specimen: Urine   Result Value Ref Range    Color, UA Yellow Yellow, Light-Yellow, Dark Yellow, [place], Straw    Appearance, UA Clear Clear    Specific Gravity, UA 1.015 1.005 - 1.030    pH, UA 5.5 5.0 - 8.5    Protein, UA Negative Negative    Glucose, UA  Negative Negative, Normal    Ketones, UA Negative Negative    Blood, UA Trace-Lysed (A) Negative    Bilirubin, UA Negative Negative    Urobilinogen, UA 0.2 0.2, 1.0, Normal    Nitrites, UA Negative Negative    Leukocyte Esterase, UA Negative Negative   Drug Screen, Urine    Collection Time: 10/10/23  9:21 PM   Result Value Ref Range    Amphetamines, Urine Negative Negative    Barbituates, Urine Negative Negative    Benzodiazepine, Urine Negative Negative    Cannabinoids, Urine Negative Negative    Cocaine, Urine Negative Negative    Opiates, Urine Negative Negative    Phencyclidine, Urine Negative Negative    pH, Urine 5.5 3.0 - 11.0    Specific Gravity, Urine Auto 1.015 1.001 - 1.035   COVID/FLU A&B PCR    Collection Time: 10/10/23  9:21 PM   Result Value Ref Range    Influenza A PCR Not Detected Not Detected    Influenza B PCR Not Detected Not Detected    SARS-CoV-2 PCR Not Detected Not Detected, Negative, Invalid   Pregnancy, urine rapid    Collection Time: 10/10/23  9:21 PM   Result Value Ref Range    Beta hCG Qualitative, Urine Negative Negative   Urinalysis, Microscopic    Collection Time: 10/10/23  9:21 PM   Result Value Ref Range    Bacteria, UA None Seen None Seen, Rare, Occasional /HPF    RBC, UA None Seen None Seen, 0-2, 3-5, 0-5 /HPF    WBC, UA None Seen None Seen, 0-2, 3-5, 0-5 /HPF    Squamous Epithelial Cells, UA Many (A) None Seen, Rare, Occasional, Occ /HPF     ECG:   Results for orders placed or performed during the hospital encounter of 09/09/23   EKG 12-lead    Collection Time: 09/09/23  4:40 PM    Narrative    Test Reason : U07.1,    Vent. Rate : 118 BPM     Atrial Rate : 118 BPM     P-R Int : 162 ms          QRS Dur : 076 ms      QT Int : 310 ms       P-R-T Axes : 057 062 -13 degrees     QTc Int : 434 ms    Sinus tachycardia  Possible Left atrial enlargement  Nonspecific T wave abnormality  Abnormal ECG  No previous ECGs available  Confirmed by Da Guerra MD ([id number]) on 9/11/2023 4:05:18  PM    Referred By: RADHIKA   SELF           Confirmed By:Da Guerra MD      IMAGING: No results found.     Assessment:      Active Hospital Problems    Diagnosis  POA    Mitral valve regurgitation [I34.0]  Unknown    Hypertension [I10]  Unknown    Depression [F32.A]  Unknown    Tachycardia [R00.0]  Yes      Resolved Hospital Problems   No resolved problems to display.       Plan:    K Dur 20 one dose.  Plan per psychiatrist  Lisinopril 5 mg restarted already

## 2023-10-12 NOTE — GROUP NOTE
Group Psychotherapy       Group Focus: Group Focus: Promoting Healthy Lifestyles     Group topic: Discharge Planning. Therapist explored patients need for identifying personal strengths and qualities in working towards mental health goals. Therapist explored patients understanding and insight on process and needs after treatment.           Number of patients in attendance: 4    Group Start Time: 1045  Group End Time:  1130  Groups Date: 10/12/2023  Group Topic:  Behavioral Health  Group Department: [facility] - [facility]  Group Facilitators:  Leanne Reynoso MSW  _____________________________________________________________________    Patient Name: Hernando Alvarado  MRN: [id number]  Patient Class: IP- Psych   Admission Date\Time: 10/11/2023  7:10 AM  Hospital Length of Stay: 1  Primary Care Provider: Camren, Primary Doctor     Referred by: Acute Psychiatry Unit Treatment Team     Target symptoms: Depression and Poor Coping Skills     Patient's response to treatment: Active Listening; Pt was able to disclose a little of her dc plan after coming into group session with SSW. Pt shared she sees Dr. Deal.      Progress toward goals: Progressing slowly     Interval History:      Diagnosis:      Plan: Continue treatment on [facility]

## 2023-10-12 NOTE — PROGRESS NOTES
10/12/2023  Hernando Alvarado   2005   [id number]        Psychiatry Progress Note     Chief Complaint: Depression    SUBJECTIVE:   Hernando Alvarado is a 17 y.o. female  placed under a PEC at [facility] due to attempting to ingest a bottle of her medication but family was apparently able to take them from her before she could ingest anything.    Patient today is calm and cooperative. She states that her mood is improved. She does appear withdrawn and guarded during this exam. Yesterday patient became combative with the staff and required a PRN. Staff have not reported any behavioral issues since this incident. Will continue current POC and monitor for need to augment.        Current Medications:   Scheduled Meds:    EScitalopram oxalate  20 mg Oral Daily    hydrOXYzine HCL  25 mg Oral TID    lamoTRIgine  100 mg Oral Daily    lisinopriL  5 mg Oral Daily      PRN Meds: acetaminophen, diphenhydrAMINE, diphenhydrAMINE, haloperidol lactate, haloperidoL, hydrOXYzine HCL, lorazepam, LORazepam, magnesium hydroxide 400 mg/5 ml, traZODone   Psychotherapeutics (From admission, onward)      Start     Stop Route Frequency Ordered    10/11/23 1015  EScitalopram oxalate tablet 20 mg         -- Oral Daily 10/11/23 0916    10/11/23 0823  haloperidoL tablet 10 mg         -- Oral Every 4 hours PRN 10/11/23 0724    10/11/23 0720  LORazepam injection 2 mg         -- IM Every 6 hours PRN 10/11/23 0720    10/11/23 0720  LORazepam tablet 2 mg         -- Oral Every 6 hours PRN 10/11/23 0720    10/11/23 0720  traZODone tablet 100 mg         -- Oral Nightly PRN 10/11/23 0720    10/11/23 0720  haloperidol lactate injection 10 mg         -- IM Every 6 hours PRN 10/11/23 0720            Allergies:   Review of patient's allergies indicates:  No Known Allergies     OBJECTIVE:   Vitals   Vitals:    10/11/23 2000   BP: 112/75   Pulse: (!) 118   Resp: 18   Temp: 98.8 °F (37.1 °C)        Labs/Imaging/Studies:   Recent Results (from the past 36  hour(s))   Comprehensive metabolic panel    Collection Time: 10/10/23  9:15 PM   Result Value Ref Range    Sodium Level 140 136 - 145 mmol/L    Potassium Level 3.8 3.5 - 5.1 mmol/L    Chloride 107 98 - 107 mmol/L    Carbon Dioxide 24 22 - 29 mmol/L    Glucose Level 97 74 - 100 mg/dL    Blood Urea Nitrogen 8.2 7.0 - 18.7 mg/dL    Creatinine 0.67 0.55 - 1.02 mg/dL    Calcium Level Total 9.3 8.4 - 10.2 mg/dL    Protein Total 7.6 6.4 - 8.3 gm/dL    Albumin Level 4.4 3.5 - 5.0 g/dL    Globulin 3.2 2.4 - 3.5 gm/dL    Albumin/Globulin Ratio 1.4 1.1 - 2.0 ratio    Bilirubin Total 0.6 <=1.5 mg/dL    Alkaline Phosphatase 92 40 - 150 unit/L    Alanine Aminotransferase 59 (H) 0 - 55 unit/L    Aspartate Aminotransferase 25 5 - 34 unit/L    eGFR >60 mls/min/1.73/m2   TSH    Collection Time: 10/10/23  9:15 PM   Result Value Ref Range    TSH 0.977 0.350 - 4.940 uIU/mL   Ethanol    Collection Time: 10/10/23  9:15 PM   Result Value Ref Range    Ethanol Level <10.0 <=10.0 mg/dL   Acetaminophen level    Collection Time: 10/10/23  9:15 PM   Result Value Ref Range    Acetaminophen Level <17.4 (L) 17.4 - 30.0 ug/ml   CBC with Differential    Collection Time: 10/10/23  9:15 PM   Result Value Ref Range    WBC 7.58 4.50 - 11.50 x10(3)/mcL    RBC 4.52 4.20 - 5.40 x10(6)/mcL    Hgb 13.5 12.0 - 16.0 g/dL    Hct 40.2 37.0 - 47.0 %    MCV 88.9 80.0 - 94.0 fL    MCH 29.9 27.0 - 31.0 pg    MCHC 33.6 33.0 - 36.0 g/dL    RDW 13.0 11.5 - 17.0 %    Platelet 211 130 - 400 x10(3)/mcL    MPV 9.3 7.4 - 10.4 fL    Neut % 68.4 %    Lymph % 22.8 %    Mono % 7.9 %    Eos % 0.5 %    Basophil % 0.3 %    Lymph # 1.73 0.6 - 4.6 x10(3)/mcL    Neut # 5.18 2.1 - 9.2 x10(3)/mcL    Mono # 0.60 0.1 - 1.3 x10(3)/mcL    Eos # 0.04 0 - 0.9 x10(3)/mcL    Baso # 0.02 <=0.2 x10(3)/mcL    IG# 0.01 0 - 0.04 x10(3)/mcL    IG% 0.1 %   Urinalysis, Reflex to Urine Culture    Collection Time: 10/10/23  9:21 PM    Specimen: Urine   Result Value Ref Range    Color, UA Yellow Yellow,  Light-Yellow, Dark Yellow, [place], Straw    Appearance, UA Clear Clear    Specific Gravity, UA 1.015 1.005 - 1.030    pH, UA 5.5 5.0 - 8.5    Protein, UA Negative Negative    Glucose, UA Negative Negative, Normal    Ketones, UA Negative Negative    Blood, UA Trace-Lysed (A) Negative    Bilirubin, UA Negative Negative    Urobilinogen, UA 0.2 0.2, 1.0, Normal    Nitrites, UA Negative Negative    Leukocyte Esterase, UA Negative Negative   Drug Screen, Urine    Collection Time: 10/10/23  9:21 PM   Result Value Ref Range    Amphetamines, Urine Negative Negative    Barbituates, Urine Negative Negative    Benzodiazepine, Urine Negative Negative    Cannabinoids, Urine Negative Negative    Cocaine, Urine Negative Negative    Opiates, Urine Negative Negative    Phencyclidine, Urine Negative Negative    pH, Urine 5.5 3.0 - 11.0    Specific Gravity, Urine Auto 1.015 1.001 - 1.035   COVID/FLU A&B PCR    Collection Time: 10/10/23  9:21 PM   Result Value Ref Range    Influenza A PCR Not Detected Not Detected    Influenza B PCR Not Detected Not Detected    SARS-CoV-2 PCR Not Detected Not Detected, Negative, Invalid   Pregnancy, urine rapid    Collection Time: 10/10/23  9:21 PM   Result Value Ref Range    Beta hCG Qualitative, Urine Negative Negative   Urinalysis, Microscopic    Collection Time: 10/10/23  9:21 PM   Result Value Ref Range    Bacteria, UA None Seen None Seen, Rare, Occasional /HPF    RBC, UA None Seen None Seen, 0-2, 3-5, 0-5 /HPF    WBC, UA None Seen None Seen, 0-2, 3-5, 0-5 /HPF    Squamous Epithelial Cells, UA Many (A) None Seen, Rare, Occasional, Occ /HPF   TSH    Collection Time: 10/12/23  6:47 AM   Result Value Ref Range    TSH 0.895 0.350 - 4.940 uIU/mL   Glucose, Fasting    Collection Time: 10/12/23  6:47 AM   Result Value Ref Range    Glucose Fasting 83 70 - 155 mg/dL   Hemoglobin A1C    Collection Time: 10/12/23  6:47 AM   Result Value Ref Range    Hemoglobin A1c 4.7 <=7.0 %    Estimated Average Glucose 88.2  mg/dL   Lipid Panel    Collection Time: 10/12/23  6:47 AM   Result Value Ref Range    Cholesterol Total 138 <=200 mg/dL    HDL Cholesterol 63 (H) 35 - 60 mg/dL    Triglyceride 25 (L) 37 - 140 mg/dL    Cholesterol/HDL Ratio 2 0 - 5    Very Low Density Lipoprotein 5     LDL Cholesterol 70.00 50.00 - 140.00 mg/dL   CBC with Differential    Collection Time: 10/12/23  6:47 AM   Result Value Ref Range    WBC 4.75 4.50 - 11.50 x10(3)/mcL    RBC 4.44 4.20 - 5.40 x10(6)/mcL    Hgb 12.9 12.0 - 16.0 g/dL    Hct 39.4 37.0 - 47.0 %    MCV 88.7 80.0 - 94.0 fL    MCH 29.1 27.0 - 31.0 pg    MCHC 32.7 (L) 33.0 - 36.0 g/dL    RDW 13.2 11.5 - 17.0 %    Platelet 217 130 - 400 x10(3)/mcL    MPV 10.3 7.4 - 10.4 fL    Neut % 43.7 %    Lymph % 44.6 %    Mono % 8.4 %    Eos % 2.3 %    Basophil % 0.8 %    Lymph # 2.12 0.6 - 4.6 x10(3)/mcL    Neut # 2.07 (L) 2.1 - 9.2 x10(3)/mcL    Mono # 0.40 0.1 - 1.3 x10(3)/mcL    Eos # 0.11 0 - 0.9 x10(3)/mcL    Baso # 0.04 <=0.2 x10(3)/mcL    IG# 0.01 0 - 0.04 x10(3)/mcL    IG% 0.2 %    NRBC% 0.0 %          Medical Review Of Systems:  A comprehensive review of systems was negative.      Psychiatric Mental Status Exam:  General Appearance: appears stated age, well-developed, well-nourished  Arousal: alert  Behavior: tearful  Movements and Motor Activity: no abnormal involuntary movements noted  Orientation: oriented to person, place, time, and situation  Speech: soft-spoken  Mood: Depressed  Affect: Mood congruent, guarded  Thought Process: linear, delayed  Associations: intact  Thought Content and Perceptions: (+)suicidal ideation, no homicidal ideation, no auditory hallucinations, no visual hallucinations, no paranoid ideation, no ideas of reference  Recent and Remote Memory: fair; per interview/observation with patient  Attention and Concentration: limited; per interview/observation with patient  Fund of Knowledge: limited; based on history, vocabulary, fund of knowledge, syntax, grammar, and  content  Insight: limited; based on understanding of severity of illness and HPI  Judgment: limited; based on patient's behavior and HPI     ASSESSMENT/PLAN:   Problems Addressed/Diagnoses:  Bipolar Disorder, most recent episode depressed, severe (F31.4)  Generalized Anxiety Disorder (F41.1)  Intellectual Disability (F79)    Past Medical History:   Diagnosis Date    ADHD     Depression     Hypertension     Mitral valve regurgitation         Plan:  Bipolar disorder, acute  -Continue Risperdal and Lamictal     Anxiety, acute  -Increase Lexapro to 20mg daily     Intellectual disability  -Group/Individual psychotherapy    Expected Disposition Plan: Home        Fede Rodriguez PMHNP-BC

## 2023-10-12 NOTE — NURSING
"Daily Nursing Note:      Behavior:    Patient (Hernando Alvarado is a 17 y.o. female, : 2005, MRN: [id number]) demonstrating an affect that was sad, flat, tearful, anxious, irritable, agitated, and  labile. Hernando demonstrating mood that is depressed, anxious, and swings. Hernando had an appearance that was clean. Hernando denies suicidal ideation. Hernando denies suicide plan. Hernando denies homicidal ideation. Hernando endorses hallucinations.    Hernando's  height is 4' 11" (1.499 m) and weight is 45.2 kg (99 lb 9.6 oz). Her temperature is 98.8 °F (37.1 °C). Her blood pressure is 112/75 and her pulse is 118 (abnormal). Her respiration is 18 and oxygen saturation is 97%.     Hernando's last BM was noted on: 10/11/23.      Intervention:    Encourage Hernando to perform self-hygiene, grooming, and changing of clothing. Monitor Hernando's behavior and program compliance. Monitor Hernando for suicidal ideation, homicidal ideation, sleep disturbance, and hallucinations. Encourage Hernando to eat all portions of meals and assess for meal preferences. Monitor Hernando for intake and output to ensure hydration. Notify the Physician/Physician Assistant/Advance Practice Registered Nurse (MD/PA/APRN) for any medication refusal and any change in patient condition.      Response:    Hernando verbalizes understand of unit process and procedures.       Plan:     Continue to monitor per MD/PA/APRN orders; maintain patient safety.    "

## 2023-10-12 NOTE — GROUP NOTE
Group Psychotherapy       Group Focus: Medication Compliance      Number of patients in attendance: 9    Group Start Time: 2030  Group End Time:  2100  Groups Date: 10/12/2023  Group Topic:  Behavioral Health  Group Department: [facility]  Group Facilitators:  Shea Coats RN  _____________________________________________________________________    Patient Name: Hernando Alvarado  MRN: [id number]  Patient Class: IP- Psych   Admission Date\Time: 10/11/2023  7:10 AM  Hospital Length of Stay: 1  Primary Care Provider: Carmen Primary Doctor     Referred by: Acute Psychiatry Unit Treatment Team     Target symptoms: Depression     Patient's response to treatment: Active Listening     Progress toward goals: Progressing slowly     Interval History:      Diagnosis: Depression     Plan: Continue treatment on [facility]

## 2023-10-12 NOTE — NURSING
Pt was seen by medical MD. Pt calm and cooperative at time of assessment. Pt requesting something for sleep. Pt denies HI/SI at time of assessment. Denies visual/auditory hallucinations at time of assessment. No acute distress noted. Safety needs met.

## 2023-10-12 NOTE — NURSING
Pt with complaint of anxiety, pt given Atarax 50 mg PO prn anxiety, will monitor for effectiveness.

## 2023-10-12 NOTE — HPI
I have been very depressed  and thought about killing myself by overdosing. My boyfriend broke up with me. Denies hallucinations or street drugs.  I have been in a psychiatric facility twice before.  All for depression. I have A LOT of mood swings. I was told I have bipolar depression.

## 2023-10-13 PROCEDURE — 11400000 HC PSYCH PRIVATE ROOM

## 2023-10-13 PROCEDURE — 25000003 PHARM REV CODE 250: Performed by: PSYCHIATRY & NEUROLOGY

## 2023-10-13 PROCEDURE — 25000003 PHARM REV CODE 250

## 2023-10-13 RX ORDER — RISPERIDONE 1 MG/1
1 TABLET ORAL NIGHTLY
Qty: 30 TABLET | Refills: 0 | Status: SHIPPED | OUTPATIENT
Start: 2023-10-13 | End: 2023-10-16 | Stop reason: SDUPTHER

## 2023-10-13 RX ORDER — LISINOPRIL 5 MG/1
5 TABLET ORAL DAILY
Qty: 30 TABLET | Refills: 0 | Status: SHIPPED | OUTPATIENT
Start: 2023-10-13 | End: 2023-10-16 | Stop reason: SDUPTHER

## 2023-10-13 RX ORDER — RISPERIDONE 1 MG/1
1 TABLET ORAL NIGHTLY
Status: DISCONTINUED | OUTPATIENT
Start: 2023-10-13 | End: 2023-10-16 | Stop reason: HOSPADM

## 2023-10-13 RX ORDER — ESCITALOPRAM OXALATE 20 MG/1
20 TABLET ORAL DAILY
Qty: 30 TABLET | Refills: 0 | Status: SHIPPED | OUTPATIENT
Start: 2023-10-14 | End: 2023-10-16 | Stop reason: SDUPTHER

## 2023-10-13 RX ORDER — LAMOTRIGINE 100 MG/1
100 TABLET ORAL DAILY
Qty: 30 TABLET | Refills: 0 | Status: SHIPPED | OUTPATIENT
Start: 2023-10-13 | End: 2023-10-16 | Stop reason: SDUPTHER

## 2023-10-13 RX ADMIN — LISINOPRIL 5 MG: 2.5 TABLET ORAL at 08:10

## 2023-10-13 RX ADMIN — RISPERIDONE 1 MG: 1 TABLET ORAL at 08:10

## 2023-10-13 RX ADMIN — LAMOTRIGINE 100 MG: 100 TABLET ORAL at 08:10

## 2023-10-13 RX ADMIN — HALOPERIDOL 10 MG: 5 TABLET ORAL at 08:10

## 2023-10-13 RX ADMIN — LORAZEPAM 2 MG: 1 TABLET ORAL at 08:10

## 2023-10-13 RX ADMIN — HYDROXYZINE HYDROCHLORIDE 25 MG: 25 TABLET, FILM COATED ORAL at 08:10

## 2023-10-13 RX ADMIN — TRAZODONE HYDROCHLORIDE 100 MG: 100 TABLET ORAL at 08:10

## 2023-10-13 RX ADMIN — HYDROXYZINE HYDROCHLORIDE 25 MG: 25 TABLET, FILM COATED ORAL at 04:10

## 2023-10-13 RX ADMIN — DIPHENHYDRAMINE HYDROCHLORIDE 50 MG: 50 CAPSULE ORAL at 08:10

## 2023-10-13 RX ADMIN — ESCITALOPRAM OXALATE 20 MG: 10 TABLET ORAL at 08:10

## 2023-10-13 NOTE — GROUP NOTE
Group Psychotherapy       Group Focus: Meds      Number of patients in attendance: 9    Group Start Time: 2030  Group End Time:  2100  Groups Date: 10/12/2023  Group Topic:  Behavioral Health  Group Department: [facility] - Behavioral Health Unit  Group Facilitators:  Donita Smiht RN  _____________________________________________________________________    Patient Name: Hernando Alvarado  MRN: [id number]  Patient Class: IP- Psych   Admission Date\Time: 10/11/2023  7:10 AM  Hospital Length of Stay: 2  Primary Care Provider: Carmen Primary Doctor     Referred by: Acute Psychiatry Unit Treatment Team     Target symptoms: Depression     Patient's response to treatment: Active Listening     Progress toward goals: Progressing adequately     Interval History:      Diagnosis: Depression     Plan: Continue treatment on [facility]

## 2023-10-13 NOTE — PROGRESS NOTES
10/13/2023  Hernando Alvarado   2005   [id number]        Psychiatry Progress Note     Chief Complaint: Depression    SUBJECTIVE:   Hernando Alvarado is a 17 y.o. female  placed under a PEC at [facility] due to attempting to ingest a bottle of her medication but family was apparently able to take them from her before she could ingest anything.    Patient today is calm and cooperative. She states that her mood is improved. She does increased anxiety and has been using PRNs. Staff have not reported any behavioral issues . Will continue current POC and monitor for need to augment. Will plan for discharge on 10/16       Current Medications:   Scheduled Meds:    EScitalopram oxalate  20 mg Oral Daily    hydrOXYzine HCL  25 mg Oral TID    lamoTRIgine  100 mg Oral Daily    lisinopriL  5 mg Oral Daily      PRN Meds: acetaminophen, aluminum-magnesium hydroxide-simethicone, diphenhydrAMINE, diphenhydrAMINE, haloperidol lactate, haloperidoL, hydrOXYzine HCL, lorazepam, LORazepam, magnesium hydroxide 400 mg/5 ml, traZODone   Psychotherapeutics (From admission, onward)      Start     Stop Route Frequency Ordered    10/11/23 1015  EScitalopram oxalate tablet 20 mg         -- Oral Daily 10/11/23 0916    10/11/23 0823  haloperidoL tablet 10 mg         -- Oral Every 4 hours PRN 10/11/23 0724    10/11/23 0720  LORazepam injection 2 mg         -- IM Every 6 hours PRN 10/11/23 0720    10/11/23 0720  LORazepam tablet 2 mg         -- Oral Every 6 hours PRN 10/11/23 0720    10/11/23 0720  traZODone tablet 100 mg         -- Oral Nightly PRN 10/11/23 0720    10/11/23 0720  haloperidol lactate injection 10 mg         -- IM Every 6 hours PRN 10/11/23 0720            Allergies:   Review of patient's allergies indicates:  No Known Allergies     OBJECTIVE:   Vitals   Vitals:    10/12/23 2100   BP: 122/79   Pulse: 93   Resp: 18   Temp: 99.3 °F (37.4 °C)        Labs/Imaging/Studies:   Recent Results (from the past 36 hour(s))   TSH     Collection Time: 10/12/23  6:47 AM   Result Value Ref Range    TSH 0.895 0.350 - 4.940 uIU/mL   Glucose, Fasting    Collection Time: 10/12/23  6:47 AM   Result Value Ref Range    Glucose Fasting 83 70 - 155 mg/dL   Hemoglobin A1C    Collection Time: 10/12/23  6:47 AM   Result Value Ref Range    Hemoglobin A1c 4.7 <=7.0 %    Estimated Average Glucose 88.2 mg/dL   Lipid Panel    Collection Time: 10/12/23  6:47 AM   Result Value Ref Range    Cholesterol Total 138 <=200 mg/dL    HDL Cholesterol 63 (H) 35 - 60 mg/dL    Triglyceride 25 (L) 37 - 140 mg/dL    Cholesterol/HDL Ratio 2 0 - 5    Very Low Density Lipoprotein 5     LDL Cholesterol 70.00 50.00 - 140.00 mg/dL   SYPHILIS ANTIBODY (WITH REFLEX RPR)    Collection Time: 10/12/23  6:47 AM   Result Value Ref Range    Syphilis Antibody Nonreactive Nonreactive, Equivocal   CBC with Differential    Collection Time: 10/12/23  6:47 AM   Result Value Ref Range    WBC 4.75 4.50 - 11.50 x10(3)/mcL    RBC 4.44 4.20 - 5.40 x10(6)/mcL    Hgb 12.9 12.0 - 16.0 g/dL    Hct 39.4 37.0 - 47.0 %    MCV 88.7 80.0 - 94.0 fL    MCH 29.1 27.0 - 31.0 pg    MCHC 32.7 (L) 33.0 - 36.0 g/dL    RDW 13.2 11.5 - 17.0 %    Platelet 217 130 - 400 x10(3)/mcL    MPV 10.3 7.4 - 10.4 fL    Neut % 43.7 %    Lymph % 44.6 %    Mono % 8.4 %    Eos % 2.3 %    Basophil % 0.8 %    Lymph # 2.12 0.6 - 4.6 x10(3)/mcL    Neut # 2.07 (L) 2.1 - 9.2 x10(3)/mcL    Mono # 0.40 0.1 - 1.3 x10(3)/mcL    Eos # 0.11 0 - 0.9 x10(3)/mcL    Baso # 0.04 <=0.2 x10(3)/mcL    IG# 0.01 0 - 0.04 x10(3)/mcL    IG% 0.2 %    NRBC% 0.0 %          Medical Review Of Systems:  A comprehensive review of systems was negative.      Psychiatric Mental Status Exam:  General Appearance: appears stated age, well-developed, well-nourished  Arousal: alert  Behavior: tearful  Movements and Motor Activity: no abnormal involuntary movements noted  Orientation: oriented to person, place, time, and situation  Speech: soft-spoken  Mood: Depressed  Affect:  Mood congruent, guarded  Thought Process: linear, delayed  Associations: intact  Thought Content and Perceptions: (+)suicidal ideation, no homicidal ideation, no auditory hallucinations, no visual hallucinations, no paranoid ideation, no ideas of reference  Recent and Remote Memory: fair; per interview/observation with patient  Attention and Concentration: limited; per interview/observation with patient  Fund of Knowledge: limited; based on history, vocabulary, fund of knowledge, syntax, grammar, and content  Insight: limited; based on understanding of severity of illness and HPI  Judgment: limited; based on patient's behavior and HPI     ASSESSMENT/PLAN:   Problems Addressed/Diagnoses:  Bipolar Disorder, most recent episode depressed, severe (F31.4)  Generalized Anxiety Disorder (F41.1)  Intellectual Disability (F79)    Past Medical History:   Diagnosis Date    ADHD     Depression     Hypertension     Mitral valve regurgitation         Plan:  Bipolar disorder, acute  -Continue Risperdal and Lamictal     Anxiety, acute  -Lexapro to 20mg daily     Intellectual disability  -Group/Individual psychotherapy    Expected Disposition Plan: Home        Fede Rodriguez [facility]

## 2023-10-13 NOTE — PLAN OF CARE
Problem: Adult Inpatient Plan of Care  Goal: Plan of Care Review  Outcome: Ongoing, Progressing  Goal: Patient-Specific Goal (Individualized)  Outcome: Ongoing, Progressing  Goal: Absence of Hospital-Acquired Illness or Injury  Outcome: Ongoing, Progressing  Goal: Optimal Comfort and Wellbeing  Outcome: Ongoing, Progressing  Goal: Readiness for Transition of Care  Outcome: Ongoing, Progressing     Problem: Violence Risk or Actual  Goal: Anger and Impulse Control  Outcome: Ongoing, Progressing     Problem: Activity and Energy Impairment (Depressive Signs/Symptoms)  Goal: Optimized Energy Level (Depressive Signs/Symptoms)  Outcome: Ongoing, Progressing     Problem: Cognitive Impairment (Depressive Signs/Symptoms)  Goal: Optimized Cognitive Function  Outcome: Ongoing, Progressing     Problem: Decreased Participation/Engagement (Depressive Signs/Symptoms)  Goal: Increased Participation and Engagement (Depressive Signs/Symptoms)  Outcome: Ongoing, Progressing     Problem: Feelings of Worthlessness, Hopelessness or Excessive Guilt (Depressive Signs/Symptoms)  Goal: Enhanced Self-Esteem and Confidence (Depressive Signs/Symptoms)  Outcome: Ongoing, Progressing     Problem: Mood Impairment (Depressive Signs/Symptoms)  Goal: Improved Mood Symptoms (Depressive Signs/Symptoms)  Outcome: Ongoing, Progressing     Problem: Psychomotor Impairment (Depressive Signs/Symptoms)  Goal: Improved Psychomotor Symptoms (Depressive Signs/Symptoms)  Outcome: Ongoing, Progressing     Problem: Sleep Disturbance (Depressive Signs/Symptoms)  Goal: Improved Sleep (Depressive Signs/Symptoms)  Outcome: Ongoing, Progressing     Problem: Social, Occupational or Functional Impairment (Depressive Signs/Symptoms)  Goal: Enhanced Social, Occupational or Functional Skills (Depressive Signs/Symptoms)  Outcome: Ongoing, Progressing

## 2023-10-13 NOTE — NURSING
"PRN Medication Follow-up Note:    Behavior:    Patient (Hernando Alvarado is a 17 y.o. female, : 2005, MRN: [id number])     Allergies: Patient has no known allergies.    Hernando's  height is 4' 11" (1.499 m) and weight is 45.2 kg (99 lb 9.6 oz). Her oral temperature is 99.3 °F (37.4 °C). Her blood pressure is 122/79 and her pulse is 93. Her respiration is 18 and oxygen saturation is 99%.     Administered per physician order to Hernando Haldol 10 mg, Benadryl 50mg and Ativan 2 mg orally      Intervention:    Intervention to Hernando's response:  Cooperative, calm and participating in groups.       Response:    Hernando's response: Medication effective      Plan:     Continue to monitor per MD/PA/APRN orders; and reevaluate medication effectiveness within 30 minutes.   "

## 2023-10-13 NOTE — NURSING
"PRN Administration Note:    Behavior:    Patient (Hernando Alvarado is a 17 y.o. female, : 2005, MRN: [id number])     Allergies: Patient has no known allergies.    Hernando's  height is 4' 11" (1.499 m) and weight is 45.2 kg (99 lb 9.6 oz). Her oral temperature is 99.3 °F (37.4 °C). Her blood pressure is 122/79 and her pulse is 93. Her respiration is 18 and oxygen saturation is 99%.     Reason for PRN Administration: Patient pacing up and down briceno crying, refusing to talk to staff, screaming in her room and continually elevating. Refusing to be re directed and de-escalating attempts failed.     Intervention:    Administered Haldol 10 mg, Benadryl 50mg and Ativan 2 mg orally  per physician order to Hernando       Response:    Hernando tolerated administration well.      Plan:     Continue to monitor per MD/PA/APRN orders; and reevaluate medication effectiveness within 30 minutes.   "

## 2023-10-13 NOTE — GROUP NOTE
Group Psychotherapy       Group Focus: Spirituality and Well-Being   Group Topic: Stress Management/Crisis Plan. Therapist assisted with understanding of treatment plan, identification of responsibilities for actions, assisted patients in identifying sources of support and developing awareness of crisis symptoms.       Number of patients in attendance: 5    Group Start Time: 1045  Group End Time:  1130  Groups Date: 10/13/2023  Group Topic:  Behavioral Health  Group Department: [facility] - Behavioral Health Unit  Group Facilitators:  Leanne Reynoso MSW  _____________________________________________________________________    Patient Name: Hernando Alvarado  MRN: [id number]  Patient Class: IP- Psych   Admission Date\Time: 10/11/2023  7:10 AM  Hospital Length of Stay: 2  Primary Care Provider: Carmen, Primary Doctor     Referred by: Acute Psychiatry Unit Treatment Team     Target symptoms: Depression and Poor Coping Skills     Patient's response to treatment: Active Listening, Self-disclosure, and Frequent Questions     Progress toward goals: Progressing slowly     Interval History:      Diagnosis:      Plan: Continue treatment on [facility]

## 2023-10-13 NOTE — CARE UPDATE
DIONI spoke to pt mother who advised pt can not return to the resdience she is at and can't return to her bf address. Pt currently has no where she can go and no other family members willing to take her in. Mother voiced wanting to get her in a group home and someone trying to help her get her check back. As of now pt dc plan is unknown. DIONI will actively work with pt and staff to come up with a plan.     DIONI spoke to [facility] to get information on residential services for people with intellectual issues and was given the information for [facility] to see about applying for waiver services.      DIONI left vm for [facility].

## 2023-10-13 NOTE — NURSING
"Daily Nursing Note:      Behavior:    Patient (Hernando Alvarado is a 17 y.o. female, : 2005, MRN: [id number]) demonstrating an affect that was flat and anxious. Hernando demonstrating mood that is depressed and anxious. Hernando had an appearance that was clean. Hernando denies suicidal ideation. Hernando denies suicide plan. Hernando denies homicidal ideation. Hernando endorses hallucinations.    Hernando's  height is 4' 11" (1.499 m) and weight is 45.2 kg (99 lb 9.6 oz). Her temperature is 98.1 °F (36.7 °C). Her blood pressure is 117/73 and her pulse is 101. Her respiration is 18 and oxygen saturation is 97%.     Hernando's last BM was noted on: _______      Intervention:    Encourage Hernando to perform self-hygiene, grooming, and changing of clothing. Monitor Hernando's behavior and program compliance. Monitor Hernando for suicidal ideation, homicidal ideation, sleep disturbance, and hallucinations. Encourage Hernando to eat all portions of meals and assess for meal preferences. Monitor Hernando for intake and output to ensure hydration. Notify the Physician/Physician Assistant/Advance Practice Registered Nurse (MD/PA/APRN) for any medication refusal and any change in patient condition.      Response:    Hernando verbalizes understand of unit process and procedures. Hernando reported medications ______.      Plan:     Continue to monitor per MD/PA/APRN orders; maintain patient safety.   "

## 2023-10-14 PROCEDURE — 11400000 HC PSYCH PRIVATE ROOM

## 2023-10-14 PROCEDURE — 25000003 PHARM REV CODE 250: Performed by: PSYCHIATRY & NEUROLOGY

## 2023-10-14 PROCEDURE — 25000003 PHARM REV CODE 250

## 2023-10-14 RX ADMIN — LAMOTRIGINE 100 MG: 100 TABLET ORAL at 09:10

## 2023-10-14 RX ADMIN — HYDROXYZINE HYDROCHLORIDE 25 MG: 25 TABLET, FILM COATED ORAL at 08:10

## 2023-10-14 RX ADMIN — RISPERIDONE 1 MG: 1 TABLET ORAL at 08:10

## 2023-10-14 RX ADMIN — TRAZODONE HYDROCHLORIDE 100 MG: 100 TABLET ORAL at 08:10

## 2023-10-14 RX ADMIN — LISINOPRIL 5 MG: 2.5 TABLET ORAL at 09:10

## 2023-10-14 RX ADMIN — HYDROXYZINE HYDROCHLORIDE 25 MG: 25 TABLET, FILM COATED ORAL at 03:10

## 2023-10-14 RX ADMIN — ESCITALOPRAM OXALATE 20 MG: 10 TABLET ORAL at 09:10

## 2023-10-14 RX ADMIN — HYDROXYZINE HYDROCHLORIDE 25 MG: 25 TABLET, FILM COATED ORAL at 09:10

## 2023-10-14 NOTE — NURSING
"PRN Administration Note:    Behavior:    Patient (Hernando Alvarado is a 17 y.o. female, : 2005, MRN: [id number])     Allergies: Patient has no known allergies.    Hernando's  height is 4' 11" (1.499 m) and weight is 45.2 kg (99 lb 9.6 oz). Her temperature is 98.2 °F (36.8 °C). Her blood pressure is 100/65 and her pulse is 99. Her respiration is 18 and oxygen saturation is 97%.     Reason for PRN Administration: _sleep_________.    Intervention:    Administered trazodone______ per physician order to Hernando       Response:    Hernando tolerated administration well.      Plan:     Continue to monitor per MD/PA/APRN orders; and reevaluate medication effectiveness within 30 minutes.   "

## 2023-10-14 NOTE — NURSING
"PRN Medication Follow-up Note:    Behavior:    Patient (Hernando Alvarado is a 17 y.o. female, : 2005, MRN: [id number])     Allergies: Patient has no known allergies.    Hernando's  height is 4' 11" (1.499 m) and weight is 45.2 kg (99 lb 9.6 oz). Her temperature is 98.2 °F (36.8 °C). Her blood pressure is 100/65 and her pulse is 99. Her respiration is 18 and oxygen saturation is 97%.     Administered trazodone_______ per physician order to Hernando       Intervention:    Intervention to Hernando's response: _none needed________.       Response:    Hernando's response: no further c/o voiced_________      Plan:     Continue to monitor per MD/PA/APRN orders; and reevaluate medication effectiveness within 30 minutes.   "

## 2023-10-14 NOTE — NURSING
"Daily Nursing Note:      Behavior:    Patient (Hernando Alvarado is a 17 y.o. female, : 2005, MRN: [id number]) demonstrating an affect that was flat and anxious. Hernando demonstrating mood that is depressed and anxious. Hernando had an appearance that was disheveled. Hernando denies suicidal ideation. Hernando denies suicide plan. Hernando denies homicidal ideation. Hernando endorses hallucinations.    Hernando's  height is 4' 11" (1.499 m) and weight is 45.2 kg (99 lb 9.6 oz). Her temperature is 98.2 °F (36.8 °C). Her blood pressure is 100/65 and her pulse is 99. Her respiration is 18 and oxygen saturation is 97%.     Hernando's last BM was noted on: _______      Intervention:    Encourage Hernando to perform self-hygiene, grooming, and changing of clothing. Monitor Hernando's behavior and program compliance. Monitor Hernando for suicidal ideation, homicidal ideation, sleep disturbance, and hallucinations. Encourage Hernando to eat all portions of meals and assess for meal preferences. Monitor Hernando for intake and output to ensure hydration. Notify the Physician/Physician Assistant/Advance Practice Registered Nurse (MD/PA/APRN) for any medication refusal and any change in patient condition.      Response:    Hernando verbalizes understand of unit process and procedures. Hernando reported medications ______.      Plan:     Continue to monitor per MD/PA/APRN orders; maintain patient safety.   "

## 2023-10-15 PROCEDURE — 11400000 HC PSYCH PRIVATE ROOM

## 2023-10-15 PROCEDURE — 25000003 PHARM REV CODE 250

## 2023-10-15 PROCEDURE — 25000003 PHARM REV CODE 250: Performed by: PSYCHIATRY & NEUROLOGY

## 2023-10-15 RX ADMIN — ESCITALOPRAM OXALATE 20 MG: 10 TABLET ORAL at 08:10

## 2023-10-15 RX ADMIN — LAMOTRIGINE 100 MG: 100 TABLET ORAL at 08:10

## 2023-10-15 RX ADMIN — ACETAMINOPHEN 650 MG: 325 TABLET, FILM COATED ORAL at 10:10

## 2023-10-15 RX ADMIN — HYDROXYZINE HYDROCHLORIDE 25 MG: 25 TABLET, FILM COATED ORAL at 08:10

## 2023-10-15 RX ADMIN — LISINOPRIL 5 MG: 2.5 TABLET ORAL at 08:10

## 2023-10-15 RX ADMIN — HYDROXYZINE HYDROCHLORIDE 25 MG: 25 TABLET, FILM COATED ORAL at 02:10

## 2023-10-15 RX ADMIN — RISPERIDONE 1 MG: 1 TABLET ORAL at 08:10

## 2023-10-15 NOTE — NURSING
"PRN Administration Note:    Behavior:    Patient (Hernando Alvarado is a 17 y.o. female, : 2005, MRN: [id number])     Allergies: Patient has no known allergies.    Hernando's  height is 4' 11" (1.499 m) and weight is 45.2 kg (99 lb 9.6 oz). Her temperature is 98.1 °F (36.7 °C). Her blood pressure is 110/71 and her pulse is 101. Her respiration is 18 and oxygen saturation is 98%.     Reason for PRN Administration: sleep__________.    Intervention:    Administered _trazodone______ per physician order to Hernando       Response:    Hernando tolerated administration well.      Plan:     Continue to monitor per MD/PA/APRN orders; and reevaluate medication effectiveness within 30 minutes.   "

## 2023-10-15 NOTE — PLAN OF CARE
Problem: Adult Inpatient Plan of Care  Goal: Plan of Care Review  Outcome: Met  Goal: Patient-Specific Goal (Individualized)  Outcome: Met  Goal: Absence of Hospital-Acquired Illness or Injury  Outcome: Met  Goal: Optimal Comfort and Wellbeing  Outcome: Met  Goal: Readiness for Transition of Care  Outcome: Met     Problem: Violence Risk or Actual  Goal: Anger and Impulse Control  Outcome: Met     Problem: Activity and Energy Impairment (Depressive Signs/Symptoms)  Goal: Optimized Energy Level (Depressive Signs/Symptoms)  Outcome: Ongoing, Progressing     Problem: Cognitive Impairment (Depressive Signs/Symptoms)  Goal: Optimized Cognitive Function  Outcome: Ongoing, Progressing     Problem: Decreased Participation/Engagement (Depressive Signs/Symptoms)  Goal: Increased Participation and Engagement (Depressive Signs/Symptoms)  Outcome: Ongoing, Progressing     Problem: Feelings of Worthlessness, Hopelessness or Excessive Guilt (Depressive Signs/Symptoms)  Goal: Enhanced Self-Esteem and Confidence (Depressive Signs/Symptoms)  Outcome: Ongoing, Progressing     Problem: Mood Impairment (Depressive Signs/Symptoms)  Goal: Improved Mood Symptoms (Depressive Signs/Symptoms)  Outcome: Ongoing, Progressing     Problem: Nutrition Imbalance (Depressive Signs/Symptoms)  Goal: Optimized Nutrition Intake  Outcome: Ongoing, Progressing     Problem: Psychomotor Impairment (Depressive Signs/Symptoms)  Goal: Improved Psychomotor Symptoms (Depressive Signs/Symptoms)  Outcome: Ongoing, Progressing     Problem: Sleep Disturbance (Depressive Signs/Symptoms)  Goal: Improved Sleep (Depressive Signs/Symptoms)  Outcome: Ongoing, Progressing     Problem: Social, Occupational or Functional Impairment (Depressive Signs/Symptoms)  Goal: Enhanced Social, Occupational or Functional Skills (Depressive Signs/Symptoms)  Outcome: Ongoing, Progressing

## 2023-10-15 NOTE — NURSING
"Daily Nursing Note:      Behavior:    Patient (Hernando Alvarado is a 17 y.o. female, : 2005, MRN: [id number]) demonstrating an affect that was flat and anxious. Hernando demonstrating mood that is anxious. Hernando had an appearance that was disheveled. Hernando denies suicidal ideation. Hernando denies suicide plan. Hernando denies homicidal ideation. Hernando denies hallucinations.    Hernando's  height is 4' 11" (1.499 m) and weight is 45.2 kg (99 lb 9.6 oz). Her temperature is 98.1 °F (36.7 °C). Her blood pressure is 110/71 and her pulse is 101. Her respiration is 18 and oxygen saturation is 98%.     Hernando's last BM was noted on: _______      Intervention:    Encourage Hernando to perform self-hygiene, grooming, and changing of clothing. Monitor Hernando's behavior and program compliance. Monitor Hernando for suicidal ideation, homicidal ideation, sleep disturbance, and hallucinations. Encourage Hernando to eat all portions of meals and assess for meal preferences. Monitor Hernando for intake and output to ensure hydration. Notify the Physician/Physician Assistant/Advance Practice Registered Nurse (MD/PA/APRN) for any medication refusal and any change in patient condition.      Response:    Hernando verbalizes understand of unit process and procedures. Hernando reported medications ______.      Plan:     Continue to monitor per MD/PA/APRN orders; maintain patient safety.   "

## 2023-10-15 NOTE — NURSING
"PRN Medication Follow-up Note:    Behavior:    Patient (Hernando Alvarado is a 17 y.o. female, : 2005, MRN: [id number])     Allergies: Patient has no known allergies.    Hernando's  height is 4' 11" (1.499 m) and weight is 45.2 kg (99 lb 9.6 oz). Her temperature is 98.1 °F (36.7 °C). Her blood pressure is 110/71 and her pulse is 101. Her respiration is 18 and oxygen saturation is 98%.     Administered _trazodone______ per physician order to Hernando       Intervention:    Intervention to Hernando's response: _none needed________.       Response:    Hernando's response: _no further c/o voiced________      Plan:     Continue to monitor per MD/PA/APRN orders; and reevaluate medication effectiveness within 30 minutes.   "

## 2023-10-16 VITALS
DIASTOLIC BLOOD PRESSURE: 77 MMHG | SYSTOLIC BLOOD PRESSURE: 115 MMHG | BODY MASS INDEX: 20.08 KG/M2 | RESPIRATION RATE: 18 BRPM | HEIGHT: 59 IN | OXYGEN SATURATION: 97 % | HEART RATE: 102 BPM | TEMPERATURE: 98 F | WEIGHT: 99.63 LBS

## 2023-10-16 PROBLEM — F41.1 GENERALIZED ANXIETY DISORDER: Status: ACTIVE | Noted: 2023-10-16

## 2023-10-16 PROBLEM — F79 UNSPECIFIED INTELLECTUAL DISABILITIES: Status: ACTIVE | Noted: 2023-10-16

## 2023-10-16 PROBLEM — F31.4 BIPOLAR I DISORDER, MOST RECENT EPISODE DEPRESSED, SEVERE WITHOUT PSYCHOTIC FEATURES: Status: ACTIVE | Noted: 2023-10-16

## 2023-10-16 PROCEDURE — 25000003 PHARM REV CODE 250: Performed by: PSYCHIATRY & NEUROLOGY

## 2023-10-16 RX ORDER — LISINOPRIL 5 MG/1
5 TABLET ORAL DAILY
Qty: 30 TABLET | Refills: 0 | Status: ON HOLD | OUTPATIENT
Start: 2023-10-16 | End: 2023-11-02

## 2023-10-16 RX ORDER — LAMOTRIGINE 100 MG/1
100 TABLET ORAL DAILY
Qty: 30 TABLET | Refills: 0 | Status: ON HOLD | OUTPATIENT
Start: 2023-10-16 | End: 2023-11-02 | Stop reason: SDUPTHER

## 2023-10-16 RX ORDER — ESCITALOPRAM OXALATE 20 MG/1
20 TABLET ORAL DAILY
Qty: 30 TABLET | Refills: 0 | Status: ON HOLD | OUTPATIENT
Start: 2023-10-16 | End: 2023-11-02

## 2023-10-16 RX ORDER — RISPERIDONE 1 MG/1
1 TABLET ORAL NIGHTLY
Qty: 30 TABLET | Refills: 0 | Status: ON HOLD | OUTPATIENT
Start: 2023-10-16 | End: 2023-11-02

## 2023-10-16 RX ADMIN — HYDROXYZINE HYDROCHLORIDE 25 MG: 25 TABLET, FILM COATED ORAL at 08:10

## 2023-10-16 RX ADMIN — LAMOTRIGINE 100 MG: 100 TABLET ORAL at 08:10

## 2023-10-16 RX ADMIN — ESCITALOPRAM OXALATE 20 MG: 10 TABLET ORAL at 08:10

## 2023-10-16 RX ADMIN — LISINOPRIL 5 MG: 2.5 TABLET ORAL at 08:10

## 2023-10-16 NOTE — PLAN OF CARE
Hernando met her treatment goal of Coping skills.  CTRS Discharge Recommendations:  Encouraged Pt. to actively utilize available community resources to increase leisure involvement to decrease signs and symptoms of illness.  Encouraged Pt. to utilize coping skills on a regular basis to reduce the risk of decomposition and re-hospitalization.

## 2023-10-16 NOTE — NURSING
Pt is scheduled for discharge today, currently voicing no ADRs or physical complaints at this time, vital signs are stable, pt is not in any physical distress at the current time, currently voicing no suicidal or homicidal ideations, voicing no hallucinations or delusions at this time, pt was given dc instructions, voiced understanding, pt will be going home with her mother and her mother will be picking up pt per SW, pt will receive a dc packet, it will contain RX for dc meds, aftercare appts, lab work and educational material, belongings were packed by t, pt will be brought up to dc area when her transportation arrives.

## 2023-10-16 NOTE — GROUP NOTE
Group Psychotherapy       Group Focus: Medication      Number of patients in attendance: 11    Group Start Time: 2030  Group End Time:  2100  Groups Date: 10/15/2023  Group Topic:  Behavioral Health  Group Department: [facility] Behavioral Health Unit  Group Facilitators:  Shea Coats RN  _____________________________________________________________________    Patient Name: Hernando Alvarado  MRN: [id number]  Patient Class: IP- Psych   Admission Date\Time: 10/11/2023  7:10 AM  Hospital Length of Stay: 4  Primary Care Provider: Carmen Primary Doctor     Referred by: Acute Psychiatry Unit Treatment Team     Target symptoms: Depression     Patient's response to treatment: Active Listening     Progress toward goals: Progressing adequately     Interval History:      Diagnosis: Depression     Plan: Continue treatment on [facility]

## 2023-10-16 NOTE — DISCHARGE SUMMARY
"DISCHARGE SUMMARY  PSYCHIATRY      Admit Date: 10/11/2023  7:10 AM    Discharge Date:  10/16/2023    SITE:   [facility]    Discharge Attending Physician: Kody Gleason M.D.    Chief Complaint:  "Ok"    History of Present Illness On Admit:   Hernando Alvarado is a 17 y.o. female placed under a PEC at [facility] due to attempting to ingest a bottle of her medication but family was apparently able to take them from her before she could ingest anything.     Her boyfriend recently breaking up with her was the precipitating stressor.  Tearful during evaluation.  It is actually difficult to get her to answer questions due to the amount of tearfulness.  After the argument with her boyfriend, boyfriend's mother dropped her off at patient's mother's house (patient was living with boyfriend), locked the car door, and drove off.  This also occurred prior to her inpatient stay 3 weeks ago in [place].     Was recently in an inpatient unit roughly 3 weeks ago in [place].  She was first put on psychiatric medication 3 months ago.  States that, at the time, she was "tripping" because she did not want to stay at her nanny's house.  States that she was "seeing things on the phone or TV when she is watching them" during that period.  However, no overt signs/symptoms of psychosis now.  On Lamictal, Risperdal, Lexapro, Vistaril, Claritin, Lisinopril, Clonidine, and a Depo shot.  Discussed adjusting some of these medications, to which she is amenable.       Of note, records review shows ED visits in 2017 with concern for "manic state" after being stopped on Risperdal.  Will continue Risperdal for now.     UDS: (-)  Blood alcohol: <10      Admit Mental Status Exam:  General Appearance: appears stated age, well-developed, well-nourished  Arousal: alert  Behavior: tearful  Movements and Motor Activity: no abnormal involuntary movements noted  Orientation: oriented to person, " place, time, and situation  Speech: soft-spoken  Mood: Depressed  Affect: reactive, tearful  Thought Process: linear, delayed  Associations: intact  Thought Content and Perceptions: (+)suicidal ideation, no homicidal ideation, no auditory hallucinations, no visual hallucinations, no paranoid ideation, no ideas of reference  Recent and Remote Memory: fair; per interview/observation with patient  Attention and Concentration: limited; per interview/observation with patient  Fund of Knowledge: limited; based on history, vocabulary, fund of knowledge, syntax, grammar, and content  Insight: limited; based on understanding of severity of illness and HPI  Judgment: limited; based on patient's behavior and HPI      Diagnoses:  PRINCIPAL PROBLEM:  Bipolar I disorder, most recent episode depressed, severe without psychotic features      PROBLEM LIST    Bipolar I disorder, most recent episode depressed, severe without psychotic features    Mitral valve regurgitation    Hypertension    Depression    Tachycardia    Generalized anxiety disorder    Unspecified intellectual disabilities        Hospital Course:   Patient was admitted to [facility] and continued on Risperdal and Lamictal.  Home Lexapro was increased to 20mg daily.    10/12/23  Patient today is calm and cooperative. She states that her mood is improved. She does appear withdrawn and guarded during this exam. Yesterday patient became combative with the staff and required a PRN. Staff have not reported any behavioral issues since this incident. Will continue current POC and monitor for need to augment.       10/13/23  Patient today is calm and cooperative. She states that her mood is improved. She does increased anxiety and has been using PRNs. Staff have not reported any behavioral issues . Will continue current POC and monitor for need to augment. Will plan for discharge on 10/16      10/16/23  Patient's mother apparently reported that patient cannot return to mother's  "house.  Patient's boyfriend and boyfriend's mother are also reported that patient cannot return there.  Staff to f/u for group home placement.  Patient no longer emotionally reactive as noted last week.  Tolerating current medication regimen without issues.  Denies thoughts of self-harm or harm to others.  Will f/u with staff regarding placement.      Current Medications:   Scheduled Meds:    EScitalopram oxalate  20 mg Oral Daily    hydrOXYzine HCL  25 mg Oral TID    lamoTRIgine  100 mg Oral Daily    lisinopriL  5 mg Oral Daily    risperiDONE  1 mg Oral QHS          DISCHARGE EXAMINATION    VITALS   Vitals:    10/13/23 1955 10/14/23 0701 10/15/23 0700 10/16/23 0701   BP: 113/79 110/71 107/61 115/77   BP Location: Left arm   Right arm   Patient Position: Sitting   Sitting   Pulse: 99 101 99 102   Resp: 18 18 20 18   Temp: 98.4 °F (36.9 °C) 98.1 °F (36.7 °C) 98.6 °F (37 °C) 98.2 °F (36.8 °C)   TempSrc: Oral   Oral   SpO2: 99% 98% 98% 97%   Weight:       Height:             Discharge Mental Status Exam:  General Appearance: appears stated age, well-developed, well-nourished  Arousal: alert  Behavior: cooperative  Movements and Motor Activity: no abnormal involuntary movements noted  Orientation: oriented to person, place, time, and situation  Speech: normal rate, normal rhythm, normal volume, normal tone  Mood: "Ok"  Affect: constricted  Thought Process: linear  Associations: intact  Thought Content and Perceptions: no suicidal ideation, no homicidal ideation, no auditory hallucinations, no visual hallucinations, no paranoid ideation, no ideas of reference  Recent and Remote Memory: recent memory intact, remote memory intact; per interview/observation with patient  Attention and Concentration: limited, attentive to conversation; per interview/observation with patient  Fund of Knowledge: limited, aware of current events, vocabulary appropriate; based on history, vocabulary, fund of knowledge, syntax, grammar, and " content  Insight: limited; based on understanding of severity of illness and HPI  Judgment: limited; based on patient's behavior and HPI      Discharge Condition:  Stable    Prognosis:  Fair    Justification for >1 antipsychotic:  N/a    Disposition:  discharged to home    Follow-up:     Follow-up Information       [facility] (Pediatrics) Follow up.    Contact information:  [phone] (FAX)  [address] [facility], [place] [id number] [place] [address] Follow up.    Why: 12.27.2023 @9:30  Bring insurance card and id  Contact information:  [phone] (FAX)  [address] [facility], [place] [id number] [place] [address] Comprehensive Follow up.    Why: Pt will utilize walk in service on Tuesday, Thursday or Friday at 8am  Bring insurance card and id  Contact information:  [phone] (FAX)  [address] [place] [address]                           Medication Regimen:    Current Facility-Administered Medications:     acetaminophen tablet 650 mg, 650 mg, Oral, Q6H PRN, Kody Gleason MD, 650 mg at 10/15/23 1034    aluminum-magnesium hydroxide-simethicone 200-200-20 mg/5 mL suspension 30 mL, 30 mL, Oral, Q6H PRN, Jayden Verdugo MD    diphenhydrAMINE capsule 50 mg, 50 mg, Oral, Q6H PRN, Kody Gleason MD, 50 mg at 10/13/23 0843    diphenhydrAMINE injection 50 mg, 50 mg, Intramuscular, Q6H PRN, Kody Gleason MD, 50 mg at 10/11/23 1255    EScitalopram oxalate tablet 20 mg, 20 mg, Oral, Daily, Kody Gleason MD, 20 mg at 10/16/23 0812    haloperidol lactate injection 10 mg, 10 mg, Intramuscular, Q6H PRN, Kody Gleason MD, 10 mg at 10/11/23 1244    haloperidoL tablet 10 mg, 10 mg, Oral, Q4H PRN, Kody Gleason MD, 10 mg at 10/13/23 0843    hydrOXYzine HCL tablet 25 mg, 25 mg, Oral, TID, Kody Gleason MD, 25 mg at 10/16/23 0812     hydrOXYzine tablet 50 mg, 50 mg, Oral, Q6H PRN, Kody Gleason MD, 50 mg at 10/12/23 1747    lamoTRIgine tablet 100 mg, 100 mg, Oral, Daily, Kody Gleason MD, 100 mg at 10/16/23 0813    lisinopriL tablet 5 mg, 5 mg, Oral, Daily, Kody Gleason MD, 5 mg at 10/16/23 0812    LORazepam injection 2 mg, 2 mg, Intramuscular, Q6H PRN, Kody Gleason MD, 2 mg at 10/11/23 1245    LORazepam tablet 2 mg, 2 mg, Oral, Q6H PRN, Kody Gleason MD, 2 mg at 10/13/23 0843    magnesium hydroxide 400 mg/5 ml suspension 2,400 mg, 30 mL, Oral, Daily PRN, Kody Gelason MD    risperiDONE tablet 1 mg, 1 mg, Oral, QHS, Fede Rodriguez PMHNP, 1 mg at 10/15/23 2009    traZODone tablet 100 mg, 100 mg, Oral, Nightly PRN, Kody Gleason MD, 100 mg at 10/14/23 2021      Patient Instructions:   Continue medication regimen as prescribed.    Disposition plan per  - see  notes for details.    Patient instructed to call 911 or present to emergency department if any of the following complications develop status post discharge: suicidality, homicidality, or grave disability.     Total time spent discharging patient: <30 minutes      Kody Gleason M.D.

## 2023-10-16 NOTE — PROGRESS NOTES
"10/16/2023  Hernando Alvarado   2005   [id number]        Psychiatry Progress Note     Chief Complaint: "Ok"    SUBJECTIVE:   Hernando Alvarado is a 17 y.o. female  placed under a PEC at [facility] due to attempting to ingest a bottle of her medication but family was apparently able to take them from her before she could ingest anything.    Patient's mother apparently reported that patient cannot return to mother's house.  Patient's boyfriend and boyfriend's mother are also reported that patient cannot return there.  Staff to f/u for group home placement.  Patient no longer emotionally reactive as noted last week.  Tolerating current medication regimen without issues.  Denies thoughts of self-harm or harm to others.  Will f/u with staff regarding placement.    UDS: (-)  Blood alcohol: <10      Current Medications:   Scheduled Meds:    EScitalopram oxalate  20 mg Oral Daily    hydrOXYzine HCL  25 mg Oral TID    lamoTRIgine  100 mg Oral Daily    lisinopriL  5 mg Oral Daily    risperiDONE  1 mg Oral QHS      PRN Meds: acetaminophen, aluminum-magnesium hydroxide-simethicone, diphenhydrAMINE, diphenhydrAMINE, haloperidol lactate, haloperidoL, hydrOXYzine HCL, lorazepam, LORazepam, magnesium hydroxide 400 mg/5 ml, traZODone   Psychotherapeutics (From admission, onward)      Start     Stop Route Frequency Ordered    10/13/23 2100  risperiDONE tablet 1 mg         -- Oral Nightly 10/13/23 1038    10/11/23 1015  EScitalopram oxalate tablet 20 mg         -- Oral Daily 10/11/23 0916    10/11/23 0823  haloperidoL tablet 10 mg         -- Oral Every 4 hours PRN 10/11/23 0724    10/11/23 0720  LORazepam injection 2 mg         -- IM Every 6 hours PRN 10/11/23 0720    10/11/23 0720  LORazepam tablet 2 mg         -- Oral Every 6 hours PRN 10/11/23 0720    10/11/23 0720  traZODone tablet 100 mg         -- Oral Nightly PRN 10/11/23 0720    10/11/23 0720  haloperidol lactate injection 10 mg         -- IM Every 6 hours PRN 10/11/23 " "0720            Allergies:   Review of patient's allergies indicates:  No Known Allergies     OBJECTIVE:   Vitals   Vitals:    10/15/23 0700   BP: 107/61   Pulse: 99   Resp: 20   Temp: 98.6 °F (37 °C)        Labs/Imaging/Studies:   No results found for this or any previous visit (from the past 36 hour(s)).       Medical Review Of Systems:  Constitutional: negative  Respiratory: negative  Cardiovascular: negative  Gastrointestinal: negative  Genitourinary:negative  Musculoskeletal:negative  Neurological: negative       Psychiatric Mental Status Exam:  General Appearance: appears stated age, well-developed, well-nourished  Arousal: alert  Behavior: cooperative  Movements and Motor Activity: no abnormal involuntary movements noted  Orientation: oriented to person, place, time, and situation  Speech: normal rate, normal rhythm, normal volume, normal tone  Mood: "Ok"  Affect: constricted  Thought Process: linear  Associations: intact  Thought Content and Perceptions: no suicidal ideation, no homicidal ideation, no auditory hallucinations, no visual hallucinations, no paranoid ideation, no ideas of reference  Recent and Remote Memory: recent memory intact, remote memory intact; per interview/observation with patient  Attention and Concentration: limited, attentive to conversation; per interview/observation with patient  Fund of Knowledge: limited, aware of current events, vocabulary appropriate; based on history, vocabulary, fund of knowledge, syntax, grammar, and content  Insight: limited; based on understanding of severity of illness and HPI  Judgment: limited; based on patient's behavior and HPI      ASSESSMENT/PLAN:   Problems Addressed/Diagnoses:  Bipolar Disorder, most recent episode depressed, severe (F31.4)  Generalized Anxiety Disorder (F41.1)  Intellectual Disability (F79)    Past Medical History:   Diagnosis Date    ADHD     Depression     Hypertension     Mitral valve regurgitation         Plan:  Bipolar " disorder, acute  -Continue Risperdal and Lamictal     Anxiety, acute  -Continue Lexapro to 20mg daily     Intellectual disability  -Group/Individual psychotherapy       Expected Disposition Plan:  Group home?        Kody Gleason M.D.

## 2023-10-16 NOTE — PLAN OF CARE
Problem: Activity and Energy Impairment (Depressive Signs/Symptoms)  Goal: Optimized Energy Level (Depressive Signs/Symptoms)  Outcome: Met     Problem: Cognitive Impairment (Depressive Signs/Symptoms)  Goal: Optimized Cognitive Function  Outcome: Met     Problem: Decreased Participation/Engagement (Depressive Signs/Symptoms)  Goal: Increased Participation and Engagement (Depressive Signs/Symptoms)  Outcome: Met     Problem: Feelings of Worthlessness, Hopelessness or Excessive Guilt (Depressive Signs/Symptoms)  Goal: Enhanced Self-Esteem and Confidence (Depressive Signs/Symptoms)  Outcome: Met     Problem: Mood Impairment (Depressive Signs/Symptoms)  Goal: Improved Mood Symptoms (Depressive Signs/Symptoms)  Outcome: Met     Problem: Nutrition Imbalance (Depressive Signs/Symptoms)  Goal: Optimized Nutrition Intake  Outcome: Met     Problem: Psychomotor Impairment (Depressive Signs/Symptoms)  Goal: Improved Psychomotor Symptoms (Depressive Signs/Symptoms)  Outcome: Met     Problem: Sleep Disturbance (Depressive Signs/Symptoms)  Goal: Improved Sleep (Depressive Signs/Symptoms)  Outcome: Met     Problem: Social, Occupational or Functional Impairment (Depressive Signs/Symptoms)  Goal: Enhanced Social, Occupational or Functional Skills (Depressive Signs/Symptoms)  Outcome: Met

## 2023-10-16 NOTE — NURSING
"Daily Nursing Note:      Behavior:    Patient (Hernando Alvarado is a 17 y.o. female, : 2005, MRN: [id number]) demonstrating an affect that was anxious. Hernando demonstrating mood that is anxious. Hernando had an appearance that was poor hygiene. Hernando denies suicidal ideation. Hernando denies suicide plan. Hernando denies homicidal ideation. Hernando denies hallucinations.    Hernando's  height is 4' 11" (1.499 m) and weight is 45.2 kg (99 lb 9.6 oz). Her temperature is 98.6 °F (37 °C). Her blood pressure is 107/61 and her pulse is 99. Her respiration is 20 and oxygen saturation is 98%.     Hernando's last BM was noted on: _______      Intervention:    Encourage Hernando to perform self-hygiene, grooming, and changing of clothing. Monitor Hernando's behavior and program compliance. Monitor Hernando for suicidal ideation, homicidal ideation, sleep disturbance, and hallucinations. Encourage Hernando to eat all portions of meals and assess for meal preferences. Monitor Hernando for intake and output to ensure hydration. Notify the Physician/Physician Assistant/Advance Practice Registered Nurse (MD/PA/APRN) for any medication refusal and any change in patient condition.      Response:    Hernando verbalizes understand of unit process and procedures. Hernando reported medications ______.      Plan:     Continue to monitor per MD/PA/APRN orders; maintain patient safety.   "

## 2023-10-20 ENCOUNTER — HOSPITAL ENCOUNTER (INPATIENT)
Facility: HOSPITAL | Age: 18
LOS: 14 days | Discharge: HOME | DRG: 885 | End: 2023-11-03
Attending: PSYCHIATRY & NEUROLOGY | Admitting: PSYCHIATRY & NEUROLOGY
Payer: MEDICAID

## 2023-10-20 ENCOUNTER — HOSPITAL ENCOUNTER (EMERGENCY)
Facility: HOSPITAL | Age: 18
Discharge: PSYCHIATRIC OR SUBSTANCE USE FACILITY | End: 2023-10-20
Attending: FAMILY MEDICINE
Payer: MEDICAID

## 2023-10-20 VITALS
OXYGEN SATURATION: 100 % | SYSTOLIC BLOOD PRESSURE: 160 MMHG | HEART RATE: 128 BPM | RESPIRATION RATE: 18 BRPM | DIASTOLIC BLOOD PRESSURE: 92 MMHG | BODY MASS INDEX: 20.08 KG/M2 | HEIGHT: 59 IN | WEIGHT: 99.63 LBS | TEMPERATURE: 99 F

## 2023-10-20 DIAGNOSIS — F29 PSYCHOSIS, UNSPECIFIED PSYCHOSIS TYPE: ICD-10-CM

## 2023-10-20 DIAGNOSIS — F23 ACUTE PSYCHOSIS: Primary | ICD-10-CM

## 2023-10-20 LAB
ALBUMIN SERPL-MCNC: 4.3 G/DL (ref 3.5–5)
ALBUMIN/GLOB SERPL: 1.4 RATIO (ref 1.1–2)
ALP SERPL-CCNC: 90 UNIT/L (ref 40–150)
ALT SERPL-CCNC: 22 UNIT/L (ref 0–55)
AMPHET UR QL SCN: NEGATIVE
APAP SERPL-MCNC: <17.4 UG/ML (ref 17.4–30)
APPEARANCE UR: ABNORMAL
AST SERPL-CCNC: 20 UNIT/L (ref 5–34)
B-HCG SERPL QL: NEGATIVE
BACTERIA #/AREA URNS AUTO: NORMAL /HPF
BARBITURATE SCN PRESENT UR: NEGATIVE
BASOPHILS # BLD AUTO: 0.02 X10(3)/MCL
BASOPHILS NFR BLD AUTO: 0.4 %
BENZODIAZ UR QL SCN: NEGATIVE
BILIRUB SERPL-MCNC: 0.7 MG/DL
BILIRUB UR QL STRIP.AUTO: NEGATIVE
BUN SERPL-MCNC: 5.7 MG/DL (ref 7–18.7)
CALCIUM SERPL-MCNC: 9.2 MG/DL (ref 8.4–10.2)
CANNABINOIDS UR QL SCN: NEGATIVE
CHLORIDE SERPL-SCNC: 107 MMOL/L (ref 98–107)
CO2 SERPL-SCNC: 23 MMOL/L (ref 22–29)
COCAINE UR QL SCN: NEGATIVE
COLOR UR AUTO: YELLOW
CREAT SERPL-MCNC: 0.6 MG/DL (ref 0.55–1.02)
EOSINOPHIL # BLD AUTO: 0.05 X10(3)/MCL (ref 0–0.9)
EOSINOPHIL NFR BLD AUTO: 0.9 %
ERYTHROCYTE [DISTWIDTH] IN BLOOD BY AUTOMATED COUNT: 12.9 % (ref 11.5–17)
ETHANOL SERPL-MCNC: <10 MG/DL
FLUAV AG UPPER RESP QL IA.RAPID: NOT DETECTED
FLUBV AG UPPER RESP QL IA.RAPID: NOT DETECTED
GFR SERPLBLD CREATININE-BSD FMLA CKD-EPI: >60 MLS/MIN/1.73/M2
GLOBULIN SER-MCNC: 3.1 GM/DL (ref 2.4–3.5)
GLUCOSE SERPL-MCNC: 90 MG/DL (ref 74–100)
GLUCOSE UR QL STRIP.AUTO: NEGATIVE
HCT VFR BLD AUTO: 43 % (ref 37–47)
HGB BLD-MCNC: 13.6 G/DL (ref 12–16)
IMM GRANULOCYTES # BLD AUTO: 0 X10(3)/MCL (ref 0–0.04)
IMM GRANULOCYTES NFR BLD AUTO: 0 %
KETONES UR QL STRIP.AUTO: NEGATIVE
LEUKOCYTE ESTERASE UR QL STRIP.AUTO: NEGATIVE
LYMPHOCYTES # BLD AUTO: 1.38 X10(3)/MCL (ref 0.6–4.6)
LYMPHOCYTES NFR BLD AUTO: 25.3 %
MCH RBC QN AUTO: 28.5 PG (ref 27–31)
MCHC RBC AUTO-ENTMCNC: 31.6 G/DL (ref 33–36)
MCV RBC AUTO: 90 FL (ref 80–94)
MONOCYTES # BLD AUTO: 0.52 X10(3)/MCL (ref 0.1–1.3)
MONOCYTES NFR BLD AUTO: 9.5 %
NEUTROPHILS # BLD AUTO: 3.48 X10(3)/MCL (ref 2.1–9.2)
NEUTROPHILS NFR BLD AUTO: 63.9 %
NITRITE UR QL STRIP.AUTO: NEGATIVE
OPIATES UR QL SCN: NEGATIVE
PCP UR QL: NEGATIVE
PH UR STRIP.AUTO: 6 [PH]
PH UR: 6 [PH] (ref 3–11)
PLATELET # BLD AUTO: 202 X10(3)/MCL (ref 130–400)
PMV BLD AUTO: 9.5 FL (ref 7.4–10.4)
POTASSIUM SERPL-SCNC: 4 MMOL/L (ref 3.5–5.1)
PROT SERPL-MCNC: 7.4 GM/DL (ref 6.4–8.3)
PROT UR QL STRIP.AUTO: NEGATIVE
RBC # BLD AUTO: 4.78 X10(6)/MCL (ref 4.2–5.4)
RBC #/AREA URNS AUTO: NORMAL /HPF
RBC UR QL AUTO: ABNORMAL
SARS-COV-2 RNA RESP QL NAA+PROBE: NOT DETECTED
SODIUM SERPL-SCNC: 139 MMOL/L (ref 136–145)
SP GR UR STRIP.AUTO: <=1.005 (ref 1–1.03)
SQUAMOUS #/AREA URNS AUTO: NORMAL /HPF
UROBILINOGEN UR STRIP-ACNC: 0.2
WBC # SPEC AUTO: 5.45 X10(3)/MCL (ref 4.5–11.5)
WBC #/AREA URNS AUTO: NORMAL /HPF

## 2023-10-20 PROCEDURE — 25000003 PHARM REV CODE 250: Performed by: PSYCHIATRY & NEUROLOGY

## 2023-10-20 PROCEDURE — 96372 THER/PROPH/DIAG INJ SC/IM: CPT | Performed by: FAMILY MEDICINE

## 2023-10-20 PROCEDURE — 12400001 HC PSYCH SEMI-PRIVATE ROOM

## 2023-10-20 PROCEDURE — 81025 URINE PREGNANCY TEST: CPT | Performed by: FAMILY MEDICINE

## 2023-10-20 PROCEDURE — 80307 DRUG TEST PRSMV CHEM ANLYZR: CPT | Performed by: FAMILY MEDICINE

## 2023-10-20 PROCEDURE — 0240U COVID/FLU A&B PCR: CPT | Performed by: FAMILY MEDICINE

## 2023-10-20 PROCEDURE — 99285 EMERGENCY DEPT VISIT HI MDM: CPT

## 2023-10-20 PROCEDURE — 85025 COMPLETE CBC W/AUTO DIFF WBC: CPT | Performed by: FAMILY MEDICINE

## 2023-10-20 PROCEDURE — 80143 DRUG ASSAY ACETAMINOPHEN: CPT | Performed by: FAMILY MEDICINE

## 2023-10-20 PROCEDURE — 63600175 PHARM REV CODE 636 W HCPCS: Performed by: FAMILY MEDICINE

## 2023-10-20 PROCEDURE — 80053 COMPREHEN METABOLIC PANEL: CPT | Performed by: FAMILY MEDICINE

## 2023-10-20 PROCEDURE — 81001 URINALYSIS AUTO W/SCOPE: CPT | Performed by: FAMILY MEDICINE

## 2023-10-20 PROCEDURE — 82077 ASSAY SPEC XCP UR&BREATH IA: CPT | Performed by: FAMILY MEDICINE

## 2023-10-20 RX ORDER — DIPHENHYDRAMINE HYDROCHLORIDE 50 MG/ML
50 INJECTION INTRAMUSCULAR; INTRAVENOUS EVERY 4 HOURS PRN
Status: DISCONTINUED | OUTPATIENT
Start: 2023-10-20 | End: 2023-11-03 | Stop reason: HOSPADM

## 2023-10-20 RX ORDER — IBUPROFEN 200 MG
1 TABLET ORAL DAILY
Status: DISCONTINUED | OUTPATIENT
Start: 2023-10-20 | End: 2023-10-25

## 2023-10-20 RX ORDER — LORAZEPAM 2 MG/ML
2 INJECTION INTRAMUSCULAR
Status: COMPLETED | OUTPATIENT
Start: 2023-10-20 | End: 2023-10-20

## 2023-10-20 RX ORDER — ESCITALOPRAM OXALATE 10 MG/1
10 TABLET ORAL
Status: ON HOLD | COMMUNITY
Start: 2023-09-18 | End: 2023-10-22

## 2023-10-20 RX ORDER — HYDROXYZINE PAMOATE 25 MG/1
25 CAPSULE ORAL 3 TIMES DAILY
Status: ON HOLD | COMMUNITY
Start: 2023-09-18 | End: 2023-11-02

## 2023-10-20 RX ORDER — LORAZEPAM 1 MG/1
2 TABLET ORAL EVERY 4 HOURS PRN
Status: DISCONTINUED | OUTPATIENT
Start: 2023-10-20 | End: 2023-11-03 | Stop reason: HOSPADM

## 2023-10-20 RX ORDER — MAG HYDROX/ALUMINUM HYD/SIMETH 200-200-20
30 SUSPENSION, ORAL (FINAL DOSE FORM) ORAL EVERY 6 HOURS PRN
Status: DISCONTINUED | OUTPATIENT
Start: 2023-10-20 | End: 2023-11-03 | Stop reason: HOSPADM

## 2023-10-20 RX ORDER — LORAZEPAM 2 MG/ML
2 INJECTION INTRAMUSCULAR EVERY 4 HOURS PRN
Status: DISCONTINUED | OUTPATIENT
Start: 2023-10-20 | End: 2023-11-03 | Stop reason: HOSPADM

## 2023-10-20 RX ORDER — TRAZODONE HYDROCHLORIDE 100 MG/1
100 TABLET ORAL NIGHTLY PRN
Status: DISCONTINUED | OUTPATIENT
Start: 2023-10-20 | End: 2023-11-03 | Stop reason: HOSPADM

## 2023-10-20 RX ORDER — HALOPERIDOL 5 MG/1
10 TABLET ORAL EVERY 4 HOURS PRN
Status: DISCONTINUED | OUTPATIENT
Start: 2023-10-20 | End: 2023-11-03 | Stop reason: HOSPADM

## 2023-10-20 RX ORDER — ACETAMINOPHEN 325 MG/1
650 TABLET ORAL EVERY 6 HOURS PRN
Status: DISCONTINUED | OUTPATIENT
Start: 2023-10-20 | End: 2023-11-03 | Stop reason: HOSPADM

## 2023-10-20 RX ORDER — HYDROXYZINE HYDROCHLORIDE 50 MG/1
50 TABLET, FILM COATED ORAL EVERY 4 HOURS PRN
Status: DISCONTINUED | OUTPATIENT
Start: 2023-10-20 | End: 2023-11-03 | Stop reason: HOSPADM

## 2023-10-20 RX ORDER — DIPHENHYDRAMINE HYDROCHLORIDE 50 MG/ML
25 INJECTION INTRAMUSCULAR; INTRAVENOUS
Status: COMPLETED | OUTPATIENT
Start: 2023-10-20 | End: 2023-10-20

## 2023-10-20 RX ORDER — LISINOPRIL 5 MG/1
1 TABLET ORAL DAILY
Status: ON HOLD | COMMUNITY
Start: 2023-08-02 | End: 2023-11-02

## 2023-10-20 RX ORDER — MEDROXYPROGESTERONE ACETATE 150 MG/ML
INJECTION, SUSPENSION INTRAMUSCULAR
Status: ON HOLD | COMMUNITY
Start: 2023-09-25 | End: 2023-11-02

## 2023-10-20 RX ORDER — DIPHENHYDRAMINE HCL 50 MG
50 CAPSULE ORAL EVERY 4 HOURS PRN
Status: DISCONTINUED | OUTPATIENT
Start: 2023-10-20 | End: 2023-11-03 | Stop reason: HOSPADM

## 2023-10-20 RX ORDER — ZIPRASIDONE MESYLATE 20 MG/ML
10 INJECTION, POWDER, LYOPHILIZED, FOR SOLUTION INTRAMUSCULAR
Status: COMPLETED | OUTPATIENT
Start: 2023-10-20 | End: 2023-10-20

## 2023-10-20 RX ORDER — HALOPERIDOL 5 MG/ML
10 INJECTION INTRAMUSCULAR EVERY 4 HOURS PRN
Status: DISCONTINUED | OUTPATIENT
Start: 2023-10-20 | End: 2023-10-21

## 2023-10-20 RX ADMIN — ZIPRASIDONE MESYLATE 10 MG: 20 INJECTION, POWDER, LYOPHILIZED, FOR SOLUTION INTRAMUSCULAR at 12:10

## 2023-10-20 RX ADMIN — LORAZEPAM 2 MG: 1 TABLET ORAL at 09:10

## 2023-10-20 RX ADMIN — HYDROXYZINE HYDROCHLORIDE 50 MG: 50 TABLET, FILM COATED ORAL at 08:10

## 2023-10-20 RX ADMIN — LORAZEPAM 2 MG: 2 INJECTION INTRAMUSCULAR; INTRAVENOUS at 11:10

## 2023-10-20 RX ADMIN — DIPHENHYDRAMINE HYDROCHLORIDE 25 MG: 50 INJECTION INTRAMUSCULAR; INTRAVENOUS at 11:10

## 2023-10-20 RX ADMIN — ACETAMINOPHEN 650 MG: 325 TABLET, FILM COATED ORAL at 04:10

## 2023-10-20 RX ADMIN — DIPHENHYDRAMINE HYDROCHLORIDE 50 MG: 50 CAPSULE ORAL at 09:10

## 2023-10-20 RX ADMIN — HALOPERIDOL 10 MG: 5 TABLET ORAL at 09:10

## 2023-10-20 RX ADMIN — HYDROXYZINE HYDROCHLORIDE 50 MG: 50 TABLET, FILM COATED ORAL at 04:10

## 2023-10-20 NOTE — PLAN OF CARE
Problem: Adult Inpatient Plan of Care  Goal: Plan of Care Review  Outcome: Ongoing, Progressing  Goal: Patient-Specific Goal (Individualized)  Outcome: Ongoing, Progressing  Goal: Absence of Hospital-Acquired Illness or Injury  Outcome: Ongoing, Progressing  Goal: Optimal Comfort and Wellbeing  Outcome: Ongoing, Progressing  Goal: Readiness for Transition of Care  Outcome: Ongoing, Progressing     Problem: Violence Risk or Actual  Goal: Anger and Impulse Control  Outcome: Ongoing, Progressing     Problem: Cognitive Impairment (Psychotic Signs/Symptoms)  Goal: Optimal Cognitive Function (Psychotic Signs/Symptoms)  Outcome: Ongoing, Progressing     Problem: Decreased Participation and Engagement (Psychotic Signs/Symptoms)  Goal: Increased Participation and Engagement (Psychotic Signs/Symptoms)  Outcome: Ongoing, Progressing     Problem: Mood Impairment (Psychotic Signs/Symptoms)  Goal: Improved Mood Symptoms (Psychotic Signs/Symptoms)  Outcome: Ongoing, Progressing     Problem: Psychomotor Impairment (Psychotic Signs/Symptoms)  Goal: Improved Psychomotor Symptoms (Psychotic Signs/Symptoms)  Outcome: Ongoing, Progressing

## 2023-10-20 NOTE — ED NOTES
Pt accepted per Toya at [facility] for Dr Alvarado at [facility]

## 2023-10-20 NOTE — ED NOTES
Attempted to call report to [facility], message left with Jennifer, will have nurse to call me back.

## 2023-10-20 NOTE — ED PROVIDER NOTES
Encounter Date: 10/20/2023       History     Chief Complaint   Patient presents with    Psychiatric Evaluation     Pt just discharged from [facility] 4 days ago -family reports she is depressed and crying and keeps saying she is pregnant      Hallucinations   17-year-old with hallucinations delusions patient feels like she is pregnant currently producing milk and wants to know that when she changed her clothes if she needs to take 2 baby out patient otherwise has no nausea no vomiting does have extensive psychiatric history including        Review of patient's allergies indicates:  No Known Allergies  Past Medical History:   Diagnosis Date    ADHD     Depression     Hypertension     Mitral valve regurgitation      History reviewed. No pertinent surgical history.  Family History   Problem Relation Age of Onset    Diabetes Mellitus Mother     Hypertension Mother     Heart attack Paternal Grandfather      Social History     Tobacco Use    Smoking status: Never    Smokeless tobacco: Never   Substance Use Topics    Alcohol use: Never    Drug use: Never     Review of Systems   Constitutional:  Negative for fever.   HENT:  Negative for sore throat.    Respiratory:  Negative for shortness of breath.    Cardiovascular:  Negative for chest pain.   Gastrointestinal:  Negative for nausea.   Genitourinary:  Negative for dysuria.   Musculoskeletal:  Negative for back pain.   Skin:  Negative for rash.   Neurological:  Negative for weakness.   Hematological:  Does not bruise/bleed easily.   Psychiatric/Behavioral:  The patient is nervous/anxious.        Physical Exam     Initial Vitals [10/20/23 1034]   BP Pulse Resp Temp SpO2   (!) 160/92 (!) 128 18 99.3 °F (37.4 °C) 100 %      MAP       --         Physical Exam    Nursing note and vitals reviewed.  Constitutional: She appears well-developed.   HENT:   Head: Normocephalic and atraumatic.   Right Ear: External ear normal.   Left Ear: External ear normal.   Nose: Nose  normal.   Mouth/Throat: Oropharynx is clear and moist. No oropharyngeal exudate.   Eyes: Conjunctivae and EOM are normal. Pupils are equal, round, and reactive to light. Right eye exhibits no discharge. Left eye exhibits no discharge.   Neck: Neck supple. No tracheal deviation present. No JVD present.   Normal range of motion.  Cardiovascular:  Normal rate, regular rhythm, normal heart sounds and intact distal pulses.     Exam reveals no gallop and no friction rub.       No murmur heard.  Pulmonary/Chest: Breath sounds normal. No stridor. No respiratory distress. She has no wheezes. She has no rhonchi. She has no rales.   Abdominal: Abdomen is soft. Bowel sounds are normal. She exhibits no distension and no mass. There is no abdominal tenderness. There is no rebound and no guarding.   Musculoskeletal:         General: Normal range of motion.      Cervical back: Normal range of motion and neck supple.     Neurological: She is alert and oriented to person, place, and time. She has normal strength. No cranial nerve deficit.   Skin: Skin is warm and dry. No rash and no abscess noted. No erythema.   Psychiatric:   Depressed hallucinating delusional tearful         ED Course   Procedures  Labs Reviewed   COMPREHENSIVE METABOLIC PANEL - Abnormal; Notable for the following components:       Result Value    Blood Urea Nitrogen 5.7 (*)     All other components within normal limits   URINALYSIS, REFLEX TO URINE CULTURE - Abnormal; Notable for the following components:    Appearance, UA Slightly Cloudy (*)     Blood, UA Trace-Lysed (*)     All other components within normal limits   ACETAMINOPHEN LEVEL - Abnormal; Notable for the following components:    Acetaminophen Level <17.4 (*)     All other components within normal limits   CBC WITH DIFFERENTIAL - Abnormal; Notable for the following components:    MCHC 31.6 (*)     All other components within normal limits   DRUG SCREEN, URINE (BEAKER) - Normal    Narrative:     Cut off  concentrations:    Amphetamines - 1000 ng/ml  Barbiturates - 200 ng/ml  Benzodiazepine - 200 ng/ml  Cannabinoids (THC) - 50 ng/ml  Cocaine - 300 ng/ml  Fentanyl - 1.0 ng/ml  MDMA - 500 ng/ml  Opiates - 300 ng/ml   Phencyclidine (PCP) - 25 ng/ml    Specimen submitted for drug analysis and tested for pH and specific gravity in order to evaluate sample integrity. Suspect tampering if specific gravity is <1.003 and/or pH is not within the range of 4.5 - 8.0  False negatives may result form substances such as bleach added to urine.  False positives may result for the presence of a substance with similar chemical structure to the drug or its metabolite.    This test provides only a PRELIMINARY analytical test result. A more specific alternate chemical method must be used in order to obtain a confirmed analytical result. Gas chromatography/mass spectrometry (GC/MS) is the preferred confirmatory method. Other chemical confirmation methods are available. Clinical consideration and professional judgement should be applied to any drug of abuse test result, particularly when preliminary positive results are used.    Positive results will be confirmed only at the physicians request. Unconfirmed screening results are to be used only for medical purposes (treatment).        ALCOHOL,MEDICAL (ETHANOL) - Normal   COVID/FLU A&B PCR - Normal    Narrative:     The Xpert Xpress SARS-CoV-2/FLU/RSV plus is a rapid, multiplexed real-time PCR test intended for the simultaneous qualitative detection and differentiation of SARS-CoV-2, Influenza A, Influenza B, and respiratory syncytial virus (RSV) viral RNA in either nasopharyngeal swab or nasal swab specimens.         PREGNANCY TEST, URINE RAPID - Normal   URINALYSIS, MICROSCOPIC - Normal   CBC W/ AUTO DIFFERENTIAL    Narrative:     The following orders were created for panel order CBC auto differential.  Procedure                               Abnormality         Status                      ---------                               -----------         ------                     CBC with Differential[[id number]]       Abnormal            Final result                 Please view results for these tests on the individual orders.   URINALYSIS          Imaging Results    None          Medications - No data to display  Medical Decision Making  Depression anxiety bipolar hallucinations delusions    Amount and/or Complexity of Data Reviewed  Labs: ordered.                               Clinical Impression:   Final diagnoses:  [F23] Acute psychosis (Primary)        ED Disposition Condition    Transfer to Psych Facility Stable          ED Prescriptions    None       Follow-up Information    None          Smith Daley MD  10/20/23 1126

## 2023-10-20 NOTE — NURSING
Admission Note:    Hernando Alvarado is a 17 y.o. female, : 2005, MRN: [id number], admitted on 10/20/2023 to [facility] ([facility]) for Kody Gleason MD with a diagnosis of Psychosis, unspecified psychosis type [F29]. Patient admitted on a status of Physician Emergency Certificate (PEC). Hernando reports no known food or drug allergies.    Patient demonstrated an affect that was tearful, anxious, expansive, and  labile. Patient demonstrated mood during assessment that was depressed, anxious, and fearful. Patient had an appearance that was disheveled.  Patient denies suicidal ideation. Patient denies suicide plan. Patient endorses hallucinations.She complains of being pregnant and having lost the baby maybe.    Hernando's  height is 5' (1.524 m) and weight is 45.7 kg (100 lb 12 oz). Her temperature is 99.3 °F (37.4 °C). Her blood pressure is 131/79 and her pulse is 133 (abnormal). Her respiration is 20.     Hernando's last BM was noted on: 10/19/2023    Metal detector screening performed via security personnel. The result of the scan was negative for metal objects. . Head-to-toe physical assessment completed with the following findings:  No contrabands found found upon body screen. A full skin assessment was performed. Hernando's skin appeared Clear and intact.  Large bruise noted to left inner lower leg. Small bruise in a healing state to right lower leg. .  Hernando was oriented to unit, staff, peers, and room. Patient belongings/valuables stored in locked intake room cabinet and changes of clothing provided to patient. Hernando was placed on Q 15 min observations.

## 2023-10-21 PROCEDURE — 25000003 PHARM REV CODE 250: Performed by: PSYCHIATRY & NEUROLOGY

## 2023-10-21 PROCEDURE — 63600175 PHARM REV CODE 636 W HCPCS: Performed by: NURSE PRACTITIONER

## 2023-10-21 PROCEDURE — 12400001 HC PSYCH SEMI-PRIVATE ROOM

## 2023-10-21 PROCEDURE — 63600175 PHARM REV CODE 636 W HCPCS: Performed by: PSYCHIATRY & NEUROLOGY

## 2023-10-21 RX ORDER — ZIPRASIDONE MESYLATE 20 MG/ML
20 INJECTION, POWDER, LYOPHILIZED, FOR SOLUTION INTRAMUSCULAR ONCE
Status: COMPLETED | OUTPATIENT
Start: 2023-10-21 | End: 2023-10-21

## 2023-10-21 RX ORDER — ZIPRASIDONE MESYLATE 20 MG/ML
20 INJECTION, POWDER, LYOPHILIZED, FOR SOLUTION INTRAMUSCULAR EVERY 4 HOURS PRN
Status: DISCONTINUED | OUTPATIENT
Start: 2023-10-21 | End: 2023-11-03 | Stop reason: HOSPADM

## 2023-10-21 RX ADMIN — HALOPERIDOL LACTATE 10 MG: 5 INJECTION, SOLUTION INTRAMUSCULAR at 06:10

## 2023-10-21 RX ADMIN — TRAZODONE HYDROCHLORIDE 100 MG: 100 TABLET ORAL at 08:10

## 2023-10-21 RX ADMIN — DIPHENHYDRAMINE HYDROCHLORIDE 50 MG: 50 CAPSULE ORAL at 08:10

## 2023-10-21 RX ADMIN — HALOPERIDOL 10 MG: 5 TABLET ORAL at 08:10

## 2023-10-21 RX ADMIN — DIPHENHYDRAMINE HYDROCHLORIDE 50 MG: 50 INJECTION INTRAMUSCULAR; INTRAVENOUS at 06:10

## 2023-10-21 RX ADMIN — LORAZEPAM 2 MG: 1 TABLET ORAL at 08:10

## 2023-10-21 RX ADMIN — ZIPRASIDONE MESYLATE 20 MG: 20 INJECTION, POWDER, LYOPHILIZED, FOR SOLUTION INTRAMUSCULAR at 11:10

## 2023-10-21 RX ADMIN — LORAZEPAM 2 MG: 2 INJECTION INTRAMUSCULAR; INTRAVENOUS at 06:10

## 2023-10-21 RX ADMIN — HYDROXYZINE HYDROCHLORIDE 50 MG: 50 TABLET, FILM COATED ORAL at 04:10

## 2023-10-21 NOTE — NURSING
Patient keeps returning to the nurse's station over and over and being told to go back to her room. Patient not being cooperative as she keeps coming out even though she was informed it is bedtime and she should not wander the halls. Patient gets argumentative with staff when told to return to her room.

## 2023-10-21 NOTE — NURSING
Patient still very paranoid and delusional at this time. Insisting that she is pregnant and that we need to look in her underwear at her vagina and keeps putting her hand in her pants. I instructed her to please stop putting her hand in her pants so that she does not give herself an infection. Patient was given an Oral B52 at this time by Tabatha STERLING. Patient is very distrustful of staff at this time. Will continue to monitor.

## 2023-10-21 NOTE — NURSING
"Patient extremely anxious at this time. Came to the nurse's station saying she was bleeding all in the toilet and all over that she had lots her baby. Patient believes that she is pregnant. I tried telling her that she is not pregnant and there was no blood in the toilet she got even more upset. Patient keeps grabbing her vaginal area saying "look at it there is a baby in there and it will die if you do not let me go home". Tried calming the patient down at this time but she is not redirectable at all. Patient is hyper-focused on thinking that she is pregnant. Asked the patient to try to lay in bed and let the Hydroxyzine work she said "no you just want to kill my baby". Patient is very paranoid at this time and extremely anxious and not redirectable at all. We will give her an oral B52 and continue to monitor the patient.   "

## 2023-10-21 NOTE — NURSING
Patient was given PRN Hydroxyzine for anxiety at this time. Patient verbalized anxiety and visual hallucinations. Will continue to monitor

## 2023-10-21 NOTE — NURSING
Patient walking back and forth to the nurse's station still having auditory and visual hallucinations at this time. Patient tearful but easily redirectable at this time. Instructed pt to go back to bed. Pt returned to her room.

## 2023-10-21 NOTE — NURSING
Patient was given PRN hydroxyzine for anxiety. Patient not only verbalized anxiety but presents with it. She is pacing up and down back and forth to the nurse's station asking for medication. She verbalized that she is pregnant and that we are going to kill her baby or that she will lose her baby if we do not give her something for anxiety so she can rest. Patient calmed down a little after taking the medication. Will continue to monitor.

## 2023-10-21 NOTE — NURSING
Daily Nursing Note:      Behavior:    Patient (Hernando Alvarado is a 17 y.o. female, : 2005, MRN: [id number]) demonstrating an affect that was tearful. Hernando demonstrating mood that is anxious. Hernando had an appearance that was disheveled. Hernando denies suicidal ideation. Hernando denies suicide plan. Hernando denies homicidal ideation. Hernando endorses auditory and visual  hallucinations.    Hernando's  height is 5' (1.524 m) and weight is 45.7 kg (100 lb 12 oz). Her temperature is 99.3 °F (37.4 °C). Her blood pressure is 131/79 and her pulse is 133 (abnormal). Her respiration is 20.     Hernando's last BM was noted on: _10/21/2023______      Intervention:    Encourage Hernando to perform self-hygiene, grooming, and changing of clothing. Monitor Hernando's behavior and program compliance. Monitor Hernando for suicidal ideation, homicidal ideation, sleep disturbance, and hallucinations. Encourage Hernando to eat all portions of meals and assess for meal preferences. Monitor Hernando for intake and output to ensure hydration. Notify the Physician/Physician Assistant/Advance Practice Registered Nurse (MD/PA/APRN) for any medication refusal and any change in patient condition.      Response:    Hernando verbalizes understand of unit process and procedures. Hernando reported medications _ PRN medications administered.  Cooperative with PRN medications. _____.      Plan:     Continue to monitor per MD/PA/APRN orders; maintain patient safety.

## 2023-10-21 NOTE — NURSING
Patient still coming to the nurse's station but she is redirectable at this time. She will go back to her room when told but does return often having to be told again. Patient does appear to be sleepy but not sedate at this time. Will continue to monitor

## 2023-10-21 NOTE — NURSING
Patient was given prn B52 which is Benadryl 50mg, Haldol 10mg and Ativan 2mg IM for extreme agitation and anxiety. Patient came to nurse's station very angry and threatening saying we gave her medication that will kill her baby. Escorted to her room patient is very paranoid and delusional. Will continue to monitor

## 2023-10-21 NOTE — NURSING
Patient constantly wanting to pace the halls and come to the nurse's station. Patient still talks about her baby is going to die. Patient is constantly digging in her pants in her vaginal area and has to be told constantly to remove her hand from her pants. Patient extremely sensitive and delusional. She is tearful at times. Instructed patient to get into bed. She does but only for a few minutes and then starts all over.

## 2023-10-21 NOTE — NURSING
Patient pacing in briceno on male side of facility.  Patient urinated on self.  Touches self inappropriately at times.  Ambulating in room with no clothes on, attempting to walk in hallway.  Difficult to redirect.  Kris Naranjo NP notified of patients behavior and of PRN medications given to patient.  New orders for Geodon 20 mg., X one does now.  Geodon 20 mg., administer into right ventral gluteal.  Accepted injection.  Tolerated well.

## 2023-10-22 PROBLEM — F29 PSYCHOSIS: Status: ACTIVE | Noted: 2023-10-22

## 2023-10-22 PROCEDURE — 63600175 PHARM REV CODE 636 W HCPCS: Performed by: NURSE PRACTITIONER

## 2023-10-22 PROCEDURE — 25000003 PHARM REV CODE 250: Performed by: NURSE PRACTITIONER

## 2023-10-22 PROCEDURE — 12400001 HC PSYCH SEMI-PRIVATE ROOM

## 2023-10-22 PROCEDURE — 25000003 PHARM REV CODE 250: Performed by: PSYCHIATRY & NEUROLOGY

## 2023-10-22 PROCEDURE — 63600175 PHARM REV CODE 636 W HCPCS: Performed by: PSYCHIATRY & NEUROLOGY

## 2023-10-22 RX ORDER — RISPERIDONE 1 MG/1
1 TABLET ORAL 2 TIMES DAILY
Status: DISCONTINUED | OUTPATIENT
Start: 2023-10-22 | End: 2023-10-22

## 2023-10-22 RX ORDER — ESCITALOPRAM OXALATE 10 MG/1
20 TABLET ORAL DAILY
Status: DISCONTINUED | OUTPATIENT
Start: 2023-10-22 | End: 2023-10-30

## 2023-10-22 RX ORDER — RISPERIDONE 1 MG/1
1 TABLET ORAL 2 TIMES DAILY
Status: DISCONTINUED | OUTPATIENT
Start: 2023-10-22 | End: 2023-10-23

## 2023-10-22 RX ORDER — LAMOTRIGINE 100 MG/1
100 TABLET ORAL DAILY
Status: DISCONTINUED | OUTPATIENT
Start: 2023-10-22 | End: 2023-11-03 | Stop reason: HOSPADM

## 2023-10-22 RX ADMIN — LAMOTRIGINE 100 MG: 100 TABLET ORAL at 12:10

## 2023-10-22 RX ADMIN — DIPHENHYDRAMINE HYDROCHLORIDE 50 MG: 50 INJECTION INTRAMUSCULAR; INTRAVENOUS at 12:10

## 2023-10-22 RX ADMIN — LORAZEPAM 2 MG: 2 INJECTION INTRAMUSCULAR; INTRAVENOUS at 10:10

## 2023-10-22 RX ADMIN — HYDROXYZINE HYDROCHLORIDE 50 MG: 50 TABLET, FILM COATED ORAL at 05:10

## 2023-10-22 RX ADMIN — DIPHENHYDRAMINE HYDROCHLORIDE 50 MG: 50 INJECTION INTRAMUSCULAR; INTRAVENOUS at 10:10

## 2023-10-22 RX ADMIN — HYDROXYZINE HYDROCHLORIDE 50 MG: 50 TABLET, FILM COATED ORAL at 08:10

## 2023-10-22 RX ADMIN — ESCITALOPRAM OXALATE 20 MG: 10 TABLET ORAL at 12:10

## 2023-10-22 RX ADMIN — ZIPRASIDONE MESYLATE 20 MG: 20 INJECTION, POWDER, LYOPHILIZED, FOR SOLUTION INTRAMUSCULAR at 12:10

## 2023-10-22 RX ADMIN — LORAZEPAM 2 MG: 2 INJECTION INTRAMUSCULAR; INTRAVENOUS at 12:10

## 2023-10-22 RX ADMIN — ZIPRASIDONE MESYLATE 20 MG: 20 INJECTION, POWDER, LYOPHILIZED, FOR SOLUTION INTRAMUSCULAR at 10:10

## 2023-10-22 RX ADMIN — RISPERIDONE 1 MG: 1 TABLET ORAL at 08:10

## 2023-10-22 NOTE — NURSING
Shea STERLING gave patient IM injection of Benadryl 50mg, Ativan 2mg, and Geodon 20mg as per md orders for B52 for agitation and  or extreme anxiety which patient has been displaying for hours. Patient will continue to be monitored. Patient tried fighting off for the injection saying that it will kill her baby. Patient in her bed at this time. Will continue to check on patient. No distress noted.

## 2023-10-22 NOTE — NURSING
Patient was given PRN oral Benadryl, haldol and ativan (B52) for anxiety and agitation. Trazadone given for sleep. Will continue to monitor patient. Patient is very agitated at this time. Yelling that her baby is going to die. I informed her that she is not pregnant that they did a pregnancy test and it was negative and there is no baby. She got really upset. Patient took her medication and went into the dayroom at this time. Will continue to monitor.

## 2023-10-22 NOTE — PLAN OF CARE
Problem: Adult Inpatient Plan of Care  Goal: Plan of Care Review  Outcome: Ongoing, Progressing  Goal: Patient-Specific Goal (Individualized)  Outcome: Ongoing, Progressing  Goal: Absence of Hospital-Acquired Illness or Injury  Outcome: Ongoing, Progressing  Goal: Optimal Comfort and Wellbeing  Outcome: Ongoing, Progressing  Goal: Readiness for Transition of Care  Outcome: Ongoing, Progressing     Problem: Violence Risk or Actual  Goal: Anger and Impulse Control  Outcome: Ongoing, Progressing     Problem: Cognitive Impairment (Psychotic Signs/Symptoms)  Goal: Optimal Cognitive Function (Psychotic Signs/Symptoms)  Outcome: Ongoing, Progressing     Problem: Decreased Participation and Engagement (Psychotic Signs/Symptoms)  Goal: Increased Participation and Engagement (Psychotic Signs/Symptoms)  Outcome: Ongoing, Progressing     Problem: Mood Impairment (Psychotic Signs/Symptoms)  Goal: Improved Mood Symptoms (Psychotic Signs/Symptoms)  Outcome: Ongoing, Progressing     Problem: Psychomotor Impairment (Psychotic Signs/Symptoms)  Goal: Improved Psychomotor Symptoms (Psychotic Signs/Symptoms)  Outcome: Ongoing, Progressing     Problem: Fall Injury Risk  Goal: Absence of Fall and Fall-Related Injury  Outcome: Ongoing, Progressing

## 2023-10-22 NOTE — NURSING
Daily Nursing Note:      Behavior:    Patient (Hernando Alvarado is a 17 y.o. female, : 2005, MRN: [id number]) demonstrating an affect that was bizarre. Hernando demonstrating mood that is anxious. Hernando had an appearance that was disheveled and poor hygiene. Hernando endorses suicidal ideation. Hernando denies suicide plan. Hernando denies homicidal ideation. Hernando endorses auditory and visual hallucinations.    Hernando's  height is 5' (1.524 m) and weight is 45.7 kg (100 lb 12 oz). Her oral temperature is 97.5 °F (36.4 °C). Her blood pressure is 172/78 (abnormal) and her pulse is 94. Her respiration is 18 and oxygen saturation is 99%.     Hernando's last BM was noted on: 10/22/2023_______      Intervention:    Encourage Hernando to perform self-hygiene, grooming, and changing of clothing. Monitor Hernando's behavior and program compliance. Monitor Hernando for suicidal ideation, homicidal ideation, sleep disturbance, and hallucinations. Encourage Hernando to eat all portions of meals and assess for meal preferences. Monitor Hernando for intake and output to ensure hydration. Notify the Physician/Physician Assistant/Advance Practice Registered Nurse (MD/PA/APRN) for any medication refusal and any change in patient condition.      Response:    Hernando verbalizes understand of unit process and procedures. Hernando reported medications _ PRN medications given.  Cooperative with PRN medications. ____.      Plan:     Continue to monitor per MD/PA/APRN orders; maintain patient safety.

## 2023-10-22 NOTE — NURSING
PRN Administration Note:    Behavior:    Patient (Hernando Alvarado is a 17 y.o. female, : 2005, MRN: [id number])     Allergies: Patient has no known allergies.    Hernando's  height is 5' (1.524 m) and weight is 45.7 kg (100 lb 12 oz). Her oral temperature is 97.5 °F (36.4 °C). Her blood pressure is 172/78 (abnormal) and her pulse is 94. Her respiration is 18 and oxygen saturation is 99%.     Reason for PRN Administration: Non redirectable behavior, Patient outside on  patio, took off her clothes.  Patient redressed. Responding to internal stimuli.  _________.    Intervention:    Administered Geodon 20 mg., administer into Left ventral gluteal.  Ativan 2 mg., administered into Left ventral gluteal.  Benadryl administer into Right upper buttock.  ______ per physician order to Hernando       Response:    Hernando tolerated administration well.      Plan:     Continue to monitor per MD/PA/APRN orders; and reevaluate medication effectiveness within 30 minutes.

## 2023-10-22 NOTE — NURSING
PRN Medication Follow-up Note:    Behavior:    Patient (Hernando Alvarado is a 17 y.o. female, : 2005, MRN: [id number])     Allergies: Patient has no known allergies.    Hernando's  height is 5' (1.524 m) and weight is 45.7 kg (100 lb 12 oz). Her oral temperature is 97.5 °F (36.4 °C). Her blood pressure is 172/78 (abnormal) and her pulse is 94. Her respiration is 18 and oxygen saturation is 99%.     Administered _ Geodon 20 mg., Ativan 2 mg., and Benadryl 50 mg., IM ______ per physician order to Hernando       Intervention:    Intervention to Hernando's response: Administer medication as ordered. ________.       Response:    Hernando's response: Decreasing agitation, psychosis and anxiety.________      Plan:     Continue to monitor per MD/PA/APRN orders; and reevaluate medication effectiveness within 30 minutes.

## 2023-10-22 NOTE — NURSING
Patient keeps pacing back and forth to the nurse's station. Informed her that she cannot stand at the nurse's station or pace the halls at this time due to other patient's sleeping and escorted her back to her room.

## 2023-10-22 NOTE — NURSING
Notified Kris Naranjo NP about the patient walking down the briceno naked and the oral B52 not working. New orders noted.

## 2023-10-22 NOTE — NURSING
Patient walking around in room with no clothes on.  Attempted to walk in hallway.  Patient redirected to put clothes on.

## 2023-10-22 NOTE — GROUP NOTE
Group Psychotherapy       Group Focus: Medication      Number of patients in attendance: 15    Group Start Time: 2030  Group End Time:  2100  Groups Date: 10/21/2023  Group Topic:  Behavioral Health  Group Department: [facility]  Group Facilitators:  Shea Coats RN  _____________________________________________________________________    Patient Name: Hernando Alvarado  MRN: [id number]  Patient Class: IP- Psych   Admission Date\Time: 10/20/2023  3:27 PM  Hospital Length of Stay: 2  Primary Care Provider: Carmen, Primary Doctor     Referred by: Acute Psychiatry Unit Treatment Team     Target symptoms: Psychosis     Patient's response to treatment: Argumentative and Disruptive     Progress toward goals: Not progressing     Interval History:      Diagnosis: Psychosis     Plan: Continue treatment on AP[facility]

## 2023-10-22 NOTE — NURSING
"Patient took her pants off at this time and was walking down the briceno with no pants on. Patient wag sent back to her room and was told to put them back on. She asked why. She then said "look my baby is dead" I informed her again there is no baby and that she cannot take her pants off due to there being men here she verbalized understanding but still seemed very confused as to why she could not do it. Will continue to monitor patient.   "

## 2023-10-22 NOTE — NURSING
Patient came to the the nurse's station and urinated all over the floor right at the door. Patient was told to go back to her room to clean herself up while we cleaned up the urine.

## 2023-10-22 NOTE — NURSING
"Patient very tearful was walking up to the nurse's station. She is very loud and was instructed to return to her room. Patient keep crying about "her baby' which she is not pregnant at this time. Patient is having hallucinations or presents to be at this time. Patient returned to her room. Will continue to monitor  "

## 2023-10-22 NOTE — H&P
"10/22/2023  Hernando Alvarado   2005   [id number]            Psychiatry Inpatient Admission Note    Date of Admission: 10/20/2023  3:27 PM    Current Legal Status: Physician's Emergency Certificate    Chief Complaint: "White stuff coming out of me"    SUBJECTIVE:   History of Present Illness:   Hernando Alvarado is a 17 y.o. female placed under a PEC at [facility] with hallucinations and delusional thought content thinking that she is pregnant and producing milk. Pregnancy test negative. Since admitted to the unit, she has been extremely anxious and delusional stating that she was bleeding in the toilet and that there was blood everywhere in the room trying to indicate that she lost the baby. Nursing staff confirms that there is no blood loss or blood in the room. She is not able to be redirected. Continues to be hyper-focused on being pregnant. Very paranoid. Over the course of the weekend, she has stripped off her clothes and ran around the unit naked several times. She has required numerous PRN injections to assist in her paranoid delusions as she is often time not able to be redirected.     She is currently drowsy due to receiving an injection just prior to this provider's arrival. However, she does admit to being noncompliant with psychotropic medications since her last discharge. Will admit to inpatient unit, restart and adjust home medications, and monitor for safety and behavior.      UDS: (-)  ETOH: <10    Past Psychiatric History:   Previous Psychiatric Hospitalizations: 2 prior inpatient stay, last being earlier this month   Previous Medication Trials: Yes  Previous Suicide Attempts: Denies   Outpatient psychiatrist: None, but followed by Lupe Kaiser NP    Past Medical/Surgical History:   Past Medical History:   Diagnosis Date    ADHD     Depression     History of psychiatric hospitalization     Hypertension     Mitral valve regurgitation      No past surgical history on file.      Family " Psychiatric History:   Sister - patient unsure of diagnosis      Allergies:   Review of patient's allergies indicates:  No Known Allergies     Substance Abuse History:   Tobacco: Denies  Alcohol: Denies  Illicit Substances: Denies  Treatment: N/A      Current Medications:   Home Psychiatric Meds: Noncompliant - Lexapro 20mg QD, Lamictal 100mg QD, Risperdal 1mg QHS,     Scheduled Meds:    EScitalopram oxalate  20 mg Oral Daily    nicotine  1 patch Transdermal Daily      PRN Meds: acetaminophen, aluminum-magnesium hydroxide-simethicone, haloperidoL **AND** diphenhydrAMINE **AND** LORazepam **AND** [DISCONTINUED] haloperidol lactate **AND** diphenhydrAMINE **AND** lorazepam, hydrOXYzine HCL, traZODone, ziprasidone   Psychotherapeutics (From admission, onward)      Start     Stop Route Frequency Ordered    10/22/23 1200  EScitalopram oxalate tablet 20 mg         -- Oral Daily 10/22/23 1046    10/21/23 2338  ziprasidone injection 20 mg         -- IM Every 4 hours PRN 10/21/23 2240    10/20/23 1529  haloperidoL tablet 10 mg  (Med - Acute  Behavioral Management)        See Hyperspace for full Linked Orders Report.    -- Oral Every 4 hours PRN 10/20/23 1529    10/20/23 1529  LORazepam tablet 2 mg  (Med - Acute  Behavioral Management)        See Hyperspace for full Linked Orders Report.    -- Oral Every 4 hours PRN 10/20/23 1529    10/20/23 1529  LORazepam injection 2 mg  (Med - Acute  Behavioral Management)        See Hyperspace for full Linked Orders Report.    -- IM Every 4 hours PRN 10/20/23 1529    10/20/23 1529  traZODone tablet 100 mg         -- Oral Nightly PRN 10/20/23 1529            Social History:  Housing Status: Was living with her boyfriend in [place].  Mother lives in [place]  Relationship Status/Sexual Orientation: Never    Children: None  Education: High school graduate   Employment Status/Info: Financially supported by family    history: None  History of physical/sexual abuse: Denies  "  Access to gun: Denies         Legal History:   Past Charges/Incarcerations: Denies   Pending charges: Denies      OBJECTIVE:   Medical Review Of Systems:  A comprehensive review of systems was negative.    Vitals   Vitals:    10/21/23 1915   BP: (!) 172/78   Pulse: 94   Resp: 18   Temp: 97.5 °F (36.4 °C)        Labs/Imaging/Studies:   No results found for this or any previous visit (from the past 48 hour(s)).   No results found for: "PHENYTOIN", "PHENOBARB", "VALPROATE", "CBMZ"        Psychiatric Mental Status Exam:  General Appearance: dressed in hospital garb, disheveled  Arousal: drowsy  Behavior: uncooperative  Movements and Motor Activity: no abnormal involuntary movements noted; no tics, no tremors, no akathisia, no dystonia, no evidence of tardive dyskinesia; no psychomotor agitation or retardation  Orientation: oriented to person and place  Speech: slurred  Mood: Dysphoric  Affect: inappropriate  Thought Process: disorganized, bizarre, circumstantial  Associations: +loosening of associations  Thought Content and Perceptions: no suicidal ideation, no homicidal ideation, + paranoid ideation, + persecutory delusions, no auditory hallucinations, no visual hallucinations  Recent and Remote Memory: Unable to Assess; per interview/observation with patient  Attention and Concentration: Unable to Assess; per interview/observation with patient  Fund of Knowledge: Unable to Assess; based on history, vocabulary, fund of knowledge, syntax, grammar, and content  Insight: poor; based on understanding of severity of illness and HPI  Judgment: poor; based on patient's behavior and HPI      Patient Strengths:  Access to care, Able to verbalize needs, and Stable physical health      Patient Liabilities:  Medication non-compliance, Psychosis, and Whitney      Discharge Criteria:  Improved mood, Improved thought process, Medication compliance, Overall functional improvement, Decreased anxiety, and Motivation for outpatient " counseling      Reason for Admission:  The patient poses a significant and immediate danger to self., The patient is gravely disabled due to a psychiatric condition., The patient presents with psychiatric symptoms of sufficient severity to bring about significant or profound impairment of day to day psychological, social, vocational, and/or educational functioning., To stabilize disabling psychiatric/psychological symptoms of sudden onset., To stabilize the decompensation of a mental disorder that severely interferes with patient's functioning., The patient has failed to make sufficient clinical gains within a traditional outpatient setting and severity of presenting symptoms requires inpatient treatment., and The patient can demonstrate a reasonable expectation of improvement in his/her disorder or condition as a result of active treatment being provided.    ASSESSMENT/PLAN:   Diagnoses:  Bipolar Disorder, Unspecified (F31.9)  Generalized Anxiety Disorder (F41.1)  Intellectual Disability (F79)     Hypertension     R/o Schizoaffective Disorder - Mixed    Past Medical History:   Diagnosis Date    ADHD     Depression     History of psychiatric hospitalization     Hypertension     Mitral valve regurgitation           Problem lists and Management Plans:  -Admit to [facility]  -Will attempt to obtain outside psychiatric records if available  - to assist with aftercare planning and collateral  -Continue inpatient treatment as evidenced by danger to self     Bipolar disorder, acute  -Continue Lamictal 100mg PO QD  -Increase Risperdal to 1mg PO BID     2.   Anxiety, acute  -Continue Lexapro 20mg daily     3.   Intellectual disability  -Group/Individual psychotherapy           Estimated length of stay: 5-7 days     Estimated Disposition: Home     Estimated Follow-up: Outpatient medication management      On this date, I have reviewed the medical history and Nursing Assessment, as well as records from referral source.   I have evaluated the mental status of the above named person and concur with the findings of all assessments.  I have provided medical direction for the development of the Treatment Plan.    I conclude that this patient meets admission criteria for inpatient treatment.  I certify that this patient poses a danger to self or others, or would otherwise be considered gravely disabled based on this assessment and/or provided collateral information.     I have provided medical direction for the development of the Treatment plan.  These services will be provided while this patient is under my care and will be based on an individualized plan of care.  The patient can demonstrate a reasonable expectation of improvement in his/her disorder as a result of the active treatment being provided.      Kris Naranjo, PMHNP-BC

## 2023-10-22 NOTE — NURSING
Patient walked up to the nurse's station with no pants and no underwear on at this time. Patient instructed once again that she cannot walk down the briceno naked and escorted back to her room to put her pants back on and put into bed.

## 2023-10-23 PROCEDURE — 63600175 PHARM REV CODE 636 W HCPCS: Performed by: NURSE PRACTITIONER

## 2023-10-23 PROCEDURE — 25000003 PHARM REV CODE 250: Performed by: PSYCHIATRY & NEUROLOGY

## 2023-10-23 PROCEDURE — 25000003 PHARM REV CODE 250: Performed by: NURSE PRACTITIONER

## 2023-10-23 PROCEDURE — 12400001 HC PSYCH SEMI-PRIVATE ROOM

## 2023-10-23 RX ORDER — RISPERIDONE 1 MG/1
1 TABLET ORAL DAILY
Status: DISCONTINUED | OUTPATIENT
Start: 2023-10-24 | End: 2023-10-25

## 2023-10-23 RX ORDER — RISPERIDONE 1 MG/1
2 TABLET ORAL NIGHTLY
Status: DISCONTINUED | OUTPATIENT
Start: 2023-10-23 | End: 2023-10-25

## 2023-10-23 RX ADMIN — ZIPRASIDONE MESYLATE 20 MG: 20 INJECTION, POWDER, LYOPHILIZED, FOR SOLUTION INTRAMUSCULAR at 02:10

## 2023-10-23 RX ADMIN — RISPERIDONE 1 MG: 1 TABLET ORAL at 09:10

## 2023-10-23 RX ADMIN — ESCITALOPRAM OXALATE 20 MG: 10 TABLET ORAL at 09:10

## 2023-10-23 RX ADMIN — LORAZEPAM 2 MG: 2 INJECTION INTRAMUSCULAR; INTRAVENOUS at 02:10

## 2023-10-23 RX ADMIN — HYDROXYZINE HYDROCHLORIDE 50 MG: 50 TABLET, FILM COATED ORAL at 08:10

## 2023-10-23 RX ADMIN — DIPHENHYDRAMINE HYDROCHLORIDE 50 MG: 50 INJECTION INTRAMUSCULAR; INTRAVENOUS at 02:10

## 2023-10-23 RX ADMIN — HYDROXYZINE HYDROCHLORIDE 50 MG: 50 TABLET, FILM COATED ORAL at 01:10

## 2023-10-23 RX ADMIN — RISPERIDONE 2 MG: 1 TABLET ORAL at 08:10

## 2023-10-23 RX ADMIN — LAMOTRIGINE 100 MG: 100 TABLET ORAL at 09:10

## 2023-10-23 NOTE — PLAN OF CARE
"Behavioral Health Unit  Psychosocial History and Assessment  Progress Note      Patient Name: Hernando Alvarado YOB: 2005 SW: HUSEYIN Murrell,[facility] Date: 10/23/2023    Chief Complaint: psychosis    Consent:     Did the patient consent for an interview with the ? No    Did the patient consent for the  to contact family/friend/caregiver?   No    Did the patient give consent for the  to inform family/friend/caregiver of his/her whereabouts or to discuss discharge planning? No    Source of Information: Chart review    Is information obtained from interviews considered reliable?   yes    Reason for Admission:     Active Hospital Problems    Diagnosis  POA    Psychosis [F29]  Unknown      Resolved Hospital Problems   No resolved problems to display.       History of Present Illness - (Patient Perception):   Patient is unable to participate in giving data pertinent to treatment and discharge  Per psych eval pt placed under PEC for hallucinations and delusional thought content thinking that she is pregnant and producing milk. Pregnancy test ran which showed pt is not pregnant. Reports shows pt stated she is bleeding in the toilet and there is blood everywhere. Nursing staff confirmed there was no blood in the room.     Present biopsychosocial functioning: poor, Pt is actively delusional and paranoid.     Past biopsychosocial functioning: Pt has hx of higher functioning.     Family and Marital/Relationship History:     Significant Other/Partner Relationships:  Single:  Recent breakup    Family Relationships: Strained      Childhood History:     Where was patient raised? [place], [place] and [place]    Who raised the patient? Pt lived with paternal grandfather until going to live with paternal aunt. Pt reported later returning to live back with mom.       How does patient describe their childhood? Pt chart review pt shared "I was happy when I was a kid."      Who " is patient's primary support person? unable to obtain       Culture and Bahai:     Bahai: Holiness    How strong of a role does Taoist and spirituality play in patient's life? None noted at this time     Episcopalian or spiritual concerns regarding treatment: not applicable     History of Abuse:   History of Abuse: Denies      Outcome: n/a    Psychiatric and Medical History:     History of psychiatric illness or treatment: prior inpatient treatment    Medical history:   Past Medical History:   Diagnosis Date    ADHD     Depression     History of psychiatric hospitalization     Hypertension     Mitral valve regurgitation        Substance Abuse History:     Alcohol - (Patient Perspective):   Social History     Substance and Sexual Activity   Alcohol Use Never       Drugs - (Patient Perspective):   Social History     Substance and Sexual Activity   Drug Use Never       Education:     Currently Enrolled? No  High School (9-12) or GED    Special Education? Yes    Interested in Completing Education/GED: No    Employment and Financial:     Currently employed? Unemployed     Source of Income:  none noted at this time     Able to afford basic needs (food, shelter, utilities)? No    Who manages finances/personal affairs? Self/mom      Service:     Maramec? no    Combat Service? No     Community Resources:     Describe present use of community resources: inpatient mh      Identify previously used community resources   (Include previous mental health treatment - outpatient and inpatient): inpatient mh, outpatient mh    Environmental:     Current living situation:Lives with family (but is unsure if pt can return to address)    Social Evaluation:     Patient Assets: Other: none noted at this time     Patient Limitations: possible homelessness, limited social support, intellectual disability, onset of psychosis      High risk psychosocial issues that may impact discharge planning:   homeless and inability to afford  medications or follow up outpatient treatment    Recommendations:     Anticipated discharge plan:  Unknown at this time  outpatient follow up    High risk issues requiring early treatment planning and immediate intervention: possible homelessness, limited social support, intellectual disability, onset of psychosis      Community resources needed for discharge planning:  housing, living arrangements, and aftercare treatment sources    Anticipated social work role(s) in treatment and discharge planning: Sw will  and offer advice along with group therapy, ind as necessary and dc planning.    10/23/23 0563   Initial Information   Source of Information health record   Reason for Admission psychosis   Patient Aware of Diagnosis yes   Arrived From emergency department   Current or Previous  Service none   Legal Status    Type of Admission Involuntary   Type of Involuntary Admission PEC   Spiritual Beliefs   Spiritual, Cultural Beliefs, Jehovah's witness Practices, Values that Affect Care yes   Description of Beliefs that Will Affect Care Rastafari   Substance Use/Withdrawal   Substance Use Denies use   Additional Tobacco Use   How many cigarettes do you typically have per day? 0   Abuse Screen (yes response referral indicated)   Feels Unsafe at Home or Work/School   (unable to obtain)   Feels Threatened by Someone   (unable to obtain)   Does anyone try to keep you from having contact with others or doing things outside your home?   (unable to obtain)   Physical Signs of Abuse Present no   Abuse Details   Physical Abuse No   Sexual Abuse No   Emotional Abuse No   AUDIT-C (Alcohol Use Disorders ID Test)   Alcohol Use In Past Year 0-->never   Alcohol Amount Per Day In Past Year 0-->none   More Than 6 Drinks On One Occasion In Past Year 0-->never   Total Audit C Score 0

## 2023-10-23 NOTE — NURSING
Daily Nursing Note:      Behavior:    Patient (Hernando Alvarado is a 17 y.o. female, : 2005, MRN: [id number]) demonstrating an affect that was flat, anxious, and  labile. Hernando demonstrating mood that is anxious and swings. Hernando had an appearance that was disheveled. Hernando denies suicidal ideation. Hernando denies suicide plan. Hernando denies homicidal ideation. Hernando endorses auditory and visual hallucinations.    Hernando's  height is 5' (1.524 m) and weight is 45.7 kg (100 lb 12 oz). Her temperature is 97.9 °F (36.6 °C). Her blood pressure is 147/95 (abnormal) and her pulse is 100. Her respiration is 18 and oxygen saturation is 97%.     Hernando's last BM was noted on: 10/22/23.      Intervention:    Encourage Hernando to perform self-hygiene, grooming, and changing of clothing. Monitor Hernando's behavior and program compliance. Monitor Hernando for suicidal ideation, homicidal ideation, sleep disturbance, and hallucinations. Encourage Hernando to eat all portions of meals and assess for meal preferences. Monitor Hernando for intake and output to ensure hydration. Notify the Physician/Physician Assistant/Advance Practice Registered Nurse (MD/PA/APRN) for any medication refusal and any change in patient condition.      Response:    Hernando verbalizes understand of unit process and procedures. Hernando is unable to report if medications are effective.      Plan:     Continue to monitor per MD/PA/APRN orders; maintain patient safety.

## 2023-10-23 NOTE — NURSING
Pt beating on the doors of the unit. Refusing to go to her room. Patient escorted back to room per Security. MHT to sit in the room with patient until she is less agitated.

## 2023-10-23 NOTE — CARE UPDATE
Pt has existing appointment with [facility]. SW will fax updated pt information regarding new admittance.       Pt sat in SW office stating she was dying and couldn't breathe. Despite SW attempting to redirect pt, pt was unable to redirect. Pt then started saying the ambulance was calling her and got up and walked out of office.

## 2023-10-23 NOTE — NURSING
Daily Nursing Note:      Behavior:    Patient (Hernando Alvarado is a 17 y.o. female, : 2005, MRN: [id number]) demonstrating an affect that was restricted and flat. Hernando demonstrating mood that is anxious and irritible. Hernando had an appearance that was disheveled and poor hygiene. Hernando endorses suicidal ideation. Hernando denies suicide plan. Hernando denies homicidal ideation. Hernando endorses auditory and visual hallucinations.    Hernando's  height is 5' (1.524 m) and weight is 45.7 kg (100 lb 12 oz). Her temperature is 97.9 °F (36.6 °C). Her blood pressure is 147/95 (abnormal) and her pulse is 100. Her respiration is 18 and oxygen saturation is 97%.     Hernando's last BM was noted on: 10/23/2023_______      Intervention:    Encourage Hernando to perform self-hygiene, grooming, and changing of clothing. Monitor Hernando's behavior and program compliance. Monitor Hernando for suicidal ideation, homicidal ideation, sleep disturbance, and hallucinations. Encourage Hernando to eat all portions of meals and assess for meal preferences. Monitor Hernando for intake and output to ensure hydration. Notify the Physician/Physician Assistant/Advance Practice Registered Nurse (MD/PA/APRN) for any medication refusal and any change in patient condition.      Response:    Hernando verbalizes understand of unit process and procedures. Hernando reported medications Effectiveness with decreasing psychosis. _____.      Plan:     Continue to monitor per MD/PA/APRN orders; maintain patient safety.

## 2023-10-23 NOTE — PROGRESS NOTES
Hernando is a 17 female admitted for Bipolar Disorder, Unspecified, Generalized Anxiety Disorder, and Intellectual Disability, with a -uds. CTRS met with Pt 1:1, Hernando was unable to tolerate assessment interview due to psychosis and delusional thought processes. CTRS utilized EMR and observation to complete assessment. Hernando was unable to ID a treatment goal at this time, CTRS utilized interdisciplinary treatment goal of Increased Participation and Engagement at this time.     10/23/23 0854   General   Admit Date 10/20/23   Primary Diagnosis Bipolar Disorder, Unspecified, Generalized Anxiety Disorder   Secondary Diagnosis Intellectual Disability   Shinto Jain   Number of Children 0   Children Living? 0   Occupation unemployed   Does the patient have dentures? No   If you were to take part in activities, which of the following would you prefer? Both   Do you feel like you have enough to keep you busy now? Yes   Do you believe that you have the opportunity for physical activity? Yes   Activity Capabilities Minimum   Subjective   Patient states I'm not feeling good, I need an ambulance   Precautions   General Precautions other (see comments)  (Hypertension)   Assessment   Mobility ambulates independently   Transfers independently   Musculoskeletal other (see comments)  (none)   Visual Acuity normal vision   Visual Perception depth perception;color perception;recognizes letters;recognizes numbers   Hearing normal   Speech/Communication normal   Cognitive Concerns slow learning ability;problem solving;abstract thinking;concentration;attention span;further evaluation may determine other cognitive concerns;disoriented to;time;situation   Emotional Concerns appears anxious;phobia-fears (comment);other (comment)  (appears delusional)   Leisure Interest Survey   Leisure Interest Survey Yes   Social/Group Activities   Shopping Current Interest   Restaurant Current Interest   Solitary Activities   Computer Activities  Current Interest   Watching Videos Current Interest   Music Listening Current Interest   Physical Activities   Walk/Run Current Interest   Creative Activities   Singing Current Interest   Spectator Events   Movies Current Interest   Passive Games   Card Games Current Interest   Goals   Additional Documentation yes   Goal Formulation With patient   Time For Goal Achievement 7 days   Goal 1 I don't know  (Increased Participation and Engagement)   Plan   Planned Therapy Intervention Group Recreational Therapy   Expected Length of Stay 5-7days   PT Frequency Minimum of 3 visits per week

## 2023-10-23 NOTE — GROUP NOTE
Group Psychotherapy       Group Focus: Medication      Number of patients in attendance: 16    Group Start Time: 2030  Group End Time:  2100  Groups Date: 10/22/2023  Group Topic:  Behavioral Health  Group Department: [facility] Behavioral Health Unit  Group Facilitators:  Shea Coats RN  _____________________________________________________________________    Patient Name: Hernando Alvarado  MRN: [id number]  Patient Class: IP- Psych   Admission Date\Time: 10/20/2023  3:27 PM  Hospital Length of Stay: 2  Primary Care Provider: Carmen, Primary Doctor     Referred by: Acute Psychiatry Unit Treatment Team     Target symptoms: Psychosis     Patient's response to treatment: Not Participating     Progress toward goals: Minimal progress     Interval History:      Diagnosis: Psychosis     Plan: Continue treatment on APU

## 2023-10-23 NOTE — PROGRESS NOTES
"Hernando attended ~10 mins of AM TR group, asking CTRS "Is my heart beating?", CTRS reassured Hernando that her heart is beating. Hernando was bizarre and appeared delusional with +RIS regarding her blood and physical statue.   10/23/23 1000   [facility] Group Therapy   Group Name Therapeutic Recreation   Specific Interventions Skilled Activity Mild Exercises   Participation Level None   Participation Quality Withdrawn;Needs Redirection   Insight/Motivation None   Affect/Mood Display Bizarre;Blunted;Anxious;Impulsive   Cognition RIS;Pre-Occupied   Psychomotor WNL       "

## 2023-10-23 NOTE — NURSING
PRN Medication Follow-up Note:    Behavior:    Patient (Hernando Alvarado is a 17 y.o. female, : 2005, MRN: [id number])     Allergies: Patient has no known allergies.    Hernando's  height is 5' (1.524 m) and weight is 45.7 kg (100 lb 12 oz). Her temperature is 99 °F (37.2 °C). Her blood pressure is 126/87 and her pulse is 109. Her respiration is 16 and oxygen saturation is 100%.     Administered  Atarax 50 mg.,______ per physician order to Hernando       Intervention:    Intervention to Hernando's response: Administer medication as ordered. _______.       Response:    Hernando's response: _ No decrease in anxiety. ________      Plan:     Continue to monitor per MD/PA/APRN orders; and reevaluate medication effectiveness within 30 minutes.

## 2023-10-23 NOTE — NURSING
PRN Administration Note:    Behavior:    Patient (Hernando Alvarado is a 17 y.o. female, : 2005, MRN: [id number])     Allergies: Patient has no known allergies.    Hernando's  height is 5' (1.524 m) and weight is 45.7 kg (100 lb 12 oz). Her temperature is 99 °F (37.2 °C). Her blood pressure is 126/87 and her pulse is 109. Her respiration is 16 and oxygen saturation is 100%.     Reason for PRN Administration: Increasing anxiety. _________.    Intervention:    Administered _ Atarax 50 mg.,______ per physician order to Hernando       Response:    Hernando tolerated administration well.      Plan:     Continue to monitor per MD/PA/APRN orders; and reevaluate medication effectiveness within 30 minutes.

## 2023-10-23 NOTE — PROGRESS NOTES
"10/23/2023  Hernando Alvarado   2005   [id number]        Psychiatry Progress Note     Chief Complaint: "Ok"    SUBJECTIVE:   Hernando Alvarado is a 17 y.o. female  placed under a PEC at [facility] due to delusions that she is pregnant and that she was producing milk.    Staff reports that she has been having behavioral outbursts while she has been here and has required PRN medication..  Thoughts are somewhat disorganized this morning.  Will increase Risperdal today and will monitor.  She appears significantly worse than she was when she left on 10/16.    She continues to report that "that lady put needles in me."  I believe that she is referring to PRN medication received here.  She also stated that she "was dead because she couldn't think."      UDS: (-)  Blood alcohol: <10      Current Medications:   Scheduled Meds:    EScitalopram oxalate  20 mg Oral Daily    lamoTRIgine  100 mg Oral Daily    nicotine  1 patch Transdermal Daily    risperiDONE  1 mg Oral BID      PRN Meds: acetaminophen, aluminum-magnesium hydroxide-simethicone, haloperidoL **AND** diphenhydrAMINE **AND** LORazepam **AND** [DISCONTINUED] haloperidol lactate **AND** diphenhydrAMINE **AND** lorazepam, hydrOXYzine HCL, traZODone, ziprasidone   Psychotherapeutics (From admission, onward)      Start     Stop Route Frequency Ordered    10/22/23 2100  risperiDONE tablet 1 mg         -- Oral 2 times daily 10/22/23 1115    10/22/23 1200  EScitalopram oxalate tablet 20 mg         -- Oral Daily 10/22/23 1046    10/21/23 2338  ziprasidone injection 20 mg         -- IM Every 4 hours PRN 10/21/23 2240    10/20/23 1529  haloperidoL tablet 10 mg  (Med - Acute  Behavioral Management)        See Hyperspace for full Linked Orders Report.    -- Oral Every 4 hours PRN 10/20/23 1529    10/20/23 1529  LORazepam tablet 2 mg  (Med - Acute  Behavioral Management)        See Hyperspace for full Linked Orders Report.    -- Oral Every 4 hours PRN 10/20/23 1529    10/20/23 " 1529  LORazepam injection 2 mg  (Med - Acute  Behavioral Management)        See Afia for full Linked Orders Report.    -- IM Every 4 hours PRN 10/20/23 1529    10/20/23 1529  traZODone tablet 100 mg         -- Oral Nightly PRN 10/20/23 1529            Allergies:   Review of patient's allergies indicates:  No Known Allergies     OBJECTIVE:   Vitals   Vitals:    10/22/23 1600   BP: (!) 147/95   Pulse: 100   Resp: 18   Temp: 97.9 °F (36.6 °C)        Labs/Imaging/Studies:   No results found for this or any previous visit (from the past 36 hour(s)).       Medical Review Of Systems:  Constitutional: negative  Respiratory: negative  Cardiovascular: negative  Gastrointestinal: negative  Genitourinary:negative  Musculoskeletal:negative  Neurological: negative       Psychiatric Mental Status Exam:  General Appearance: appears stated age, well-developed, well-nourished  Arousal: alert  Behavior: labile at times  Movements and Motor Activity: no abnormal involuntary movements noted  Orientation: oriented to person, place, time, and situation  Speech: normal rate, normal rhythm, normal volume, normal tone  Mood: Dysphoric  Affect: constricted  Thought Process: tangential, disorganzied  Associations: loose  Thought Content and Perceptions: (+)hallucinations, (+)Delusions, no suicidal ideation, no homicidal ideation, no auditory hallucinations, no visual hallucinations, no paranoid ideation, no ideas of reference  Recent and Remote Memory: recent memory intact, remote memory intact; per interview/observation with patient  Attention and Concentration: limited, attentive to conversation; per interview/observation with patient  Fund of Knowledge: limited, aware of current events, vocabulary appropriate; based on history, vocabulary, fund of knowledge, syntax, grammar, and content  Insight: limited; based on understanding of severity of illness and HPI  Judgment: limited; based on patient's behavior and HPI      ASSESSMENT/PLAN:    Problems Addressed/Diagnoses:  Bipolar Disorder, most recent episode depressed, severe (F31.4)  Generalized Anxiety Disorder (F41.1)  Intellectual Disability (F79)    Past Medical History:   Diagnosis Date    ADHD     Depression     History of psychiatric hospitalization     Hypertension     Mitral valve regurgitation         Plan:  Bipolar disorder, acute  -Increase Risperdal to 1mg daily and 2mg QHS  -Continue Lamictal     Anxiety, acute  -Continue Lexapro to 20mg daily     Intellectual disability  -Group/Individual psychotherapy       Expected Disposition Plan: Placement needed        Kody Gleason M.D.

## 2023-10-23 NOTE — GROUP NOTE
Group Psychotherapy       Group Focus: Promoting Healthy Lifestyles   Group topic: Discharge Planning.  explored patients need for identifying personal strengths and qualities in working towards mental health goals. SW educated patients on all aspects of discharge planning to help decrease inpatient mental health stays.         Number of patients in attendance: 8    Group Start Time: 1045  Group End Time:  1130  Groups Date: 10/23/2023  Group Topic:  Behavioral Health  Group Department: [facility] - Behavioral Health Unit  Group Facilitators:  Leanne Reynoso MSW  _____________________________________________________________________    Patient Name: Hernando Alvarado  MRN: [id number]  Patient Class: IP- Psych   Admission Date\Time: 10/20/2023  3:27 PM  Hospital Length of Stay: 3  Primary Care Provider: Carmen, Primary Doctor     Referred by: Acute Psychiatry Unit Treatment Team     Target symptoms: Psychosis     Patient's response to treatment: Active Listening; Pt was extremely bizarre by blurting out random things as SW spoke to pts. Pt was redirected several times with short moments between pt blurting out things again.      Progress toward goals: Not progressing     Interval History:      Diagnosis:      Plan: Continue treatment on APU

## 2023-10-23 NOTE — NURSING
"Pacing in room. Repeatedly coming to the nurse's station looking for her mother. Pt states "I'm tripping" Redirected to her room multiple times.   "

## 2023-10-23 NOTE — CARE UPDATE
"Peer approached Sw office with pt stating that pt was scared to go in room to use the restroom due to someone named Da being in her room. SW entered the room with pt to show her no one was in the room. Pt stated, "Oh he must have left." Pt entered the restroom and staff closed the curtain. At this time SW exited the room and stood by the door. At this time pt was yelling from the bathroom that there was blood and the baby was in the toilet. Pt then exited the bathroom stating the baby was still inside her and the cord was around it's neck. Pt was able to slightly redirect stating she was scared.   "

## 2023-10-23 NOTE — PROGRESS NOTES
"Hernando attended PM TR group, asking "Is my baby dead?" And reporting "I killed my baby, They are going to hurt me, They are going to kill me, I'm bleeding". CTRS reassured Hernando that she was not bleeding nor was anyone trying to kill or hurt her. Hernando was bizarre and appeared delusional with +RIS regarding blood and a baby, approaching male peers reporting "That Malcolm (her boyfriend)" getting inappropriately close to assorted male peers personal space, despite encouraging Hernando to sit with the group every 1-2 mins.   10/23/23 1500   [facility] Group Therapy   Group Name Therapeutic Recreation   Specific Interventions Skilled Activity Creative Expression   Participation Level Minimal   Participation Quality Disruptive;Needs Redirection;Requires Prompting   Insight/Motivation None   Affect/Mood Display Bizarre;Restless;Labile;Inappropriate;Impulsive;Hypersexual;Blunted;Agitated;Anxious;Depressed   Cognition RIS;Pre-Occupied   Psychomotor WNL       "

## 2023-10-23 NOTE — NURSING
"Pt in room screaming, "I'm dying." Pt going into peers rooms and attempting to go on the male briceno. Pt unable to be redirected and agitated. Benadryl 50 mg  IM, Geodon 20 mg IM and Ativan 2 mg IM given. Will monitor for effectiveness of medication.   "

## 2023-10-23 NOTE — NURSING
PRN Medication Follow-up Note:    Behavior:    Patient (Hernando Alvarado is a 17 y.o. female, : 2005, MRN: [id number])     Allergies: Patient has no known allergies.    Hernando's  height is 5' (1.524 m) and weight is 45.7 kg (100 lb 12 oz). Her temperature is 97.9 °F (36.6 °C). Her blood pressure is 147/95 (abnormal) and her pulse is 100. Her respiration is 18 and oxygen saturation is 97%.     Administered Benadryl 50mg IM, Geodon 20 mg IM and Ativan 2 mg IM per physician order to Hernando for non-redirectable agitation.      Intervention:    Intervention to Hernando's response: Injections tolerated well.       Response:    Hernando's response: Resting quietly in bed with eyes closed. No further agitation or aggression noted.       Plan:     Continue to monitor per MD/PA/APRN orders; and reevaluate medication effectiveness within 30 minutes.

## 2023-10-23 NOTE — PROGRESS NOTES
Pt observed throughout the day complaining that she sees dead people, that she is dead, and that her baby . Poor hygiene and the smell of urine is observed. Pt appears anxious and unable to rest. When laying in her bed, pt observed talking about her mother and fearful of the safety of others.

## 2023-10-24 PROCEDURE — 25000003 PHARM REV CODE 250: Performed by: PSYCHIATRY & NEUROLOGY

## 2023-10-24 PROCEDURE — 12400001 HC PSYCH SEMI-PRIVATE ROOM

## 2023-10-24 PROCEDURE — 25000003 PHARM REV CODE 250: Performed by: NURSE PRACTITIONER

## 2023-10-24 PROCEDURE — 25000003 PHARM REV CODE 250

## 2023-10-24 RX ORDER — DOXEPIN HYDROCHLORIDE 10 MG/1
10 CAPSULE ORAL NIGHTLY
Status: DISCONTINUED | OUTPATIENT
Start: 2023-10-24 | End: 2023-11-03 | Stop reason: HOSPADM

## 2023-10-24 RX ADMIN — LAMOTRIGINE 100 MG: 100 TABLET ORAL at 08:10

## 2023-10-24 RX ADMIN — HALOPERIDOL 10 MG: 5 TABLET ORAL at 03:10

## 2023-10-24 RX ADMIN — LORAZEPAM 2 MG: 1 TABLET ORAL at 03:10

## 2023-10-24 RX ADMIN — DIPHENHYDRAMINE HYDROCHLORIDE 50 MG: 50 CAPSULE ORAL at 03:10

## 2023-10-24 RX ADMIN — RISPERIDONE 2 MG: 1 TABLET ORAL at 08:10

## 2023-10-24 RX ADMIN — DOXEPIN HYDROCHLORIDE 10 MG: 10 CAPSULE ORAL at 08:10

## 2023-10-24 RX ADMIN — ESCITALOPRAM OXALATE 20 MG: 10 TABLET ORAL at 08:10

## 2023-10-24 RX ADMIN — RISPERIDONE 1 MG: 1 TABLET ORAL at 08:10

## 2023-10-24 NOTE — PROGRESS NOTES
"10/24/2023  Hernando Alvarado   2005   [id number]        Psychiatry Progress Note     Chief Complaint: "Ok"    SUBJECTIVE:   Hernando Alvarado is a 17 y.o. female  placed under a PEC at [facility] due to delusions that she is pregnant and that she was producing milk.    Staff reports that she has been responding to internal stimuli. During this exam she was very unorganized and bizarre. She spoke of needing to see "Blue", who is apparently her boyfriend because she "needs an open casket." Patient continues to believe she is dead. She was somewhat redirectable. Appears to be tolerating medication well. She is not sleeping at night. Will address this with Doxepin tonight and monitor for improvement.         UDS: (-)  Blood alcohol: <10      Current Medications:   Scheduled Meds:    EScitalopram oxalate  20 mg Oral Daily    lamoTRIgine  100 mg Oral Daily    nicotine  1 patch Transdermal Daily    risperiDONE  1 mg Oral Daily    risperiDONE  2 mg Oral QHS      PRN Meds: acetaminophen, aluminum-magnesium hydroxide-simethicone, haloperidoL **AND** diphenhydrAMINE **AND** LORazepam **AND** [DISCONTINUED] haloperidol lactate **AND** diphenhydrAMINE **AND** lorazepam, hydrOXYzine HCL, traZODone, ziprasidone   Psychotherapeutics (From admission, onward)      Start     Stop Route Frequency Ordered    10/24/23 0900  risperiDONE tablet 1 mg         -- Oral Daily 10/23/23 1125    10/23/23 2100  risperiDONE tablet 2 mg         -- Oral Nightly 10/23/23 1125    10/22/23 1200  EScitalopram oxalate tablet 20 mg         -- Oral Daily 10/22/23 1046    10/21/23 2338  ziprasidone injection 20 mg         -- IM Every 4 hours PRN 10/21/23 2240    10/20/23 1529  haloperidoL tablet 10 mg  (Med - Acute  Behavioral Management)        See Hyperspace for full Linked Orders Report.    -- Oral Every 4 hours PRN 10/20/23 1529    10/20/23 1529  LORazepam tablet 2 mg  (Med - Acute  Behavioral Management)        See Hyperspace for full Linked Orders " Report.    -- Oral Every 4 hours PRN 10/20/23 1529    10/20/23 1529  LORazepam injection 2 mg  (Med - Acute  Behavioral Management)        See Afia for full Linked Orders Report.    -- IM Every 4 hours PRN 10/20/23 1529    10/20/23 1529  traZODone tablet 100 mg         -- Oral Nightly PRN 10/20/23 1529            Allergies:   Review of patient's allergies indicates:  No Known Allergies     OBJECTIVE:   Vitals   Vitals:    10/23/23 2116   BP: (!) 145/99   Pulse: (!) 119   Resp: 18   Temp: 98.6 °F (37 °C)        Labs/Imaging/Studies:   No results found for this or any previous visit (from the past 36 hour(s)).       Medical Review Of Systems:  Constitutional: negative  Respiratory: negative  Cardiovascular: negative  Gastrointestinal: negative  Genitourinary:negative  Musculoskeletal:negative  Neurological: negative       Psychiatric Mental Status Exam:  General Appearance: appears stated age, well-developed, well-nourished  Arousal: alert  Behavior: labile at times  Movements and Motor Activity: no abnormal involuntary movements noted  Orientation: oriented to person, place, time, and situation  Speech: normal rate, normal rhythm, normal volume, normal tone  Mood: Dysphoric  Affect: constricted  Thought Process: tangential, disorganzied  Associations: loose  Thought Content and Perceptions: (+)hallucinations, (+)Delusions, no suicidal ideation, no homicidal ideation, no auditory hallucinations, no visual hallucinations, no paranoid ideation, no ideas of reference  Recent and Remote Memory: recent memory intact, remote memory intact; per interview/observation with patient  Attention and Concentration: limited, attentive to conversation; per interview/observation with patient  Fund of Knowledge: limited, aware of current events, vocabulary appropriate; based on history, vocabulary, fund of knowledge, syntax, grammar, and content  Insight: limited; based on understanding of severity of illness and HPI  Judgment:  limited; based on patient's behavior and HPI      ASSESSMENT/PLAN:   Problems Addressed/Diagnoses:  Bipolar Disorder, most recent episode depressed, severe (F31.4)  Generalized Anxiety Disorder (F41.1)  Intellectual Disability (F79)    Past Medical History:   Diagnosis Date    ADHD     Depression     History of psychiatric hospitalization     Hypertension     Mitral valve regurgitation         Plan:  Bipolar disorder, acute  -Continue Risperdal 1 mg daily and 2 mg QHS  -Continue Lamictal     Anxiety, acute  -Continue Lexapro to 20mg daily     Intellectual disability  -Group/Individual psychotherapy    Insomnia  -Doxepin 10 mg         Expected Disposition Plan: Placement needed        ROB Delgado-BC

## 2023-10-24 NOTE — GROUP NOTE
Group Psychotherapy       Group Focus: Medication Compliance      Number of patients in attendance: 12    Group Start Time: 2030  Group End Time:  2100  Groups Date: 10/23/2023  Group Topic:  Behavioral Health  Group Department: [facility] Behavioral Health Unit  Group Facilitators:  Shea Coats RN  _____________________________________________________________________    Patient Name: Hernando Alvarado  MRN: [id number]  Patient Class: IP- Psych   Admission Date\Time: 10/20/2023  3:27 PM  Hospital Length of Stay: 4  Primary Care Provider: Carmen, Primary Doctor     Referred by: Acute Psychiatry Unit Treatment Team     Target symptoms: Psychosis     Patient's response to treatment: Not Participating and Disruptive     Progress toward goals: Not progressing     Interval History:      Diagnosis: Psychosis     Plan: Continue treatment on APU

## 2023-10-24 NOTE — NURSING
PRN Medication Follow-up Note:    Behavior:    Patient (Hernando Alvarado is a 17 y.o. female, : 2005, MRN: [id number])     Allergies: Patient has no known allergies.    Hernando's  height is 5' (1.524 m) and weight is 45.7 kg (100 lb 12 oz). Her temperature is 98.6 °F (37 °C). Her blood pressure is 145/99 (abnormal) and her pulse is 119 (abnormal). Her respiration is 18 and oxygen saturation is 100%.     Administered Hydroxyzine 50mg per physician order to Hernando for anxiety.      Intervention:    Intervention to Hernando's response: Medication tolerated well.       Response:    Hernando's response: Medication effective in decreasing anxiety.    Plan:     Continue to monitor per MD/PA/APRN orders; and reevaluate medication effectiveness within 30 minutes.

## 2023-10-24 NOTE — PROGRESS NOTES
"Davin attended AM TR group while on 1:1 precautions; reported delusional information "My baby is dead, my water is rising, I'm sick and need to go to the hospital"; CTRS reassured davin that there was no baby, there was no water, and she was at the hospital already at [facility]. Davin was more redirectable and accepting redirections from staff and peers this morning.   10/24/23 1000   [facility] Group Therapy   Group Name Therapeutic Recreation   Specific Interventions Skilled Activity Leisure Education and Awareness   Participation Level None   Participation Quality Requires Prompting;Needs Redirection;Disruptive   Insight/Motivation None   Affect/Mood Display Bizarre;Blunted;Impulsive;Restless;Inappropriate   Cognition RIS;Pre-Occupied;Confused;Delusional   Psychomotor WNL       "

## 2023-10-24 NOTE — NURSING
Pacing in the hallway, repeatedly coming to the nurses station with complaints of being in labor or her baby being dead in her room. Pt requiring frequent reassurance and redirection. Will Continue to monitor for changes in behavior.

## 2023-10-24 NOTE — NURSING
"Pt is in her room screaming and crying calling out for various men. "Sudheer Parker and Jesse" Pt is saying that she is dead and requesting that she be killed. Pt is very labile yelling one moment calm the next. Pt requiring constant supervision. Pt attempting to go in male peers room. Pt is responding to internal stimuli and difficult to redirect.   "

## 2023-10-24 NOTE — NURSING
Daily Nursing Note:      Behavior:    Patient (Hernando Alvarado is a 17 y.o. female, : 2005, MRN: [id number]) demonstrating an affect that was restricted. Hernando demonstrating mood that is anxious and swings. Hernando had an appearance that was disheveled and poor hygiene. Hernando  endorses suicidal ideation. Hernando denies suicide plan. Hernando denies homicidal ideation. Hernando endorses auditory and visual  hallucinations.    Hernando's  height is 5' (1.524 m) and weight is 45.7 kg (100 lb 12 oz). Her temperature is 98.6 °F (37 °C). Her blood pressure is 137/101 (abnormal) and her pulse is 129 (abnormal). Her respiration is 18 and oxygen saturation is 97%.     Hernando's last BM was noted on: 10/23/2023_______      Intervention:    Encourage Hernando to perform self-hygiene, grooming, and changing of clothing. Monitor Hernando's behavior and program compliance. Monitor Hernando for suicidal ideation, homicidal ideation, sleep disturbance, and hallucinations. Encourage Hernando to eat all portions of meals and assess for meal preferences. Monitor Hernando for intake and output to ensure hydration. Notify the Physician/Physician Assistant/Advance Practice Registered Nurse (MD/PA/APRN) for any medication refusal and any change in patient condition.      Response:    Hernando verbalizes understand of unit process and procedures. Hernando reported medications Effectiveness with decreasing psychosis._____.      Plan:     Continue to monitor per MD/PA/APRN orders; maintain patient safety.

## 2023-10-24 NOTE — NURSING
PRN Medication Follow-up Note:    Behavior:    Patient (Hernando Alvarado is a 17 y.o. female, : 2005, MRN: [id number])     Allergies: Patient has no known allergies.    Hernando's  height is 5' (1.524 m) and weight is 45.7 kg (100 lb 12 oz). Her temperature is 98.6 °F (37 °C). Her blood pressure is 145/99 (abnormal) and her pulse is 119 (abnormal). Her respiration is 18 and oxygen saturation is 100%.     Administered Benadryl 50mg, Haldol 10mg and Ativan 2mg po given per physician order to Hernando for non redirectable agitation.       Intervention:    Intervention to Hernando's response: Medication tolerated well.       Response:    Hernando's response: Medication not effective. Pt continues to yell and actively hallucinate and respond to internal stimuli. Continues to try to come out of her room naked.      Plan:     Continue to monitor per MD/PA/APRN orders; and reevaluate medication effectiveness within 30 minutes.

## 2023-10-24 NOTE — NURSING
"Pt in hallway naked attempting to go into room 7A. Pt escorted to her room per MHT. Pt difficult to redirect. "That's my boyfriend's house." Benadryl 50 mg, Ativan 2mg and Haldol 10mg po given. Will monitor for effectiveness of medication.   "

## 2023-10-24 NOTE — GROUP NOTE
Group Psychotherapy       Group Focus: Strength Training   Sw educated pts on mental health and how it plays a role within daily life. Sw explored support system with each pt.      Number of patients in attendance: 8    Group Start Time: 1045  Group End Time:  1130  Groups Date: 10/24/2023  Group Topic:  Behavioral Health  Group Department: [facility] Behavioral Health Unit  Group Facilitators:  Leanne Reynoso MSW  _____________________________________________________________________    Patient Name: Hernando Alvarado  MRN: [id number]  Patient Class: IP- Psych   Admission Date\Time: 10/20/2023  3:27 PM  Hospital Length of Stay: 4  Primary Care Provider: Carmen, Primary Doctor     Referred by: Acute Psychiatry Unit Treatment Team     Target symptoms: Psychosis     Patient's response to treatment: Active Listening and Disruptive     Progress toward goals: Not progressing     Interval History:      Diagnosis:      Plan: Continue treatment on APU

## 2023-10-24 NOTE — NURSING
Daily Nursing Note:      Behavior:    Patient (Hernando Alvarado is a 17 y.o. female, : 2005, MRN: [id number]) demonstrating an affect that was flat and  labile. Hernando demonstrating mood that is anxious and swings. Hernando had an appearance that was disheveled and poor hygiene. Hernando denies suicidal ideation. Hernando denies suicide plan. Hernando denies homicidal ideation. Hernando endorses auditory and visual hallucinations. Hernando is paranoid and believes that staff is here to kill her. Reassurance of safety provided.     Hernando's  height is 5' (1.524 m) and weight is 45.7 kg (100 lb 12 oz). Her temperature is 98.6 °F (37 °C). Her blood pressure is 145/99 (abnormal) and her pulse is 119 (abnormal). Her respiration is 18 and oxygen saturation is 100%.     Hernando's last BM was noted on:10/23/23      Intervention:    Encourage Hernando to perform self-hygiene, grooming, and changing of clothing. Monitor Hernando's behavior and program compliance. Monitor Hernando for suicidal ideation, homicidal ideation, sleep disturbance, and hallucinations. Encourage Hernando to eat all portions of meals and assess for meal preferences. Monitor Hernando for intake and output to ensure hydration. Notify the Physician/Physician Assistant/Advance Practice Registered Nurse (MD/PA/APRN) for any medication refusal and any change in patient condition.      Response:    Hernando verbalizes understand of unit process and procedures. Hernando reported medications are ineffective.      Plan:     Continue to monitor per MD/PA/APRN orders; maintain patient safety.

## 2023-10-25 PROCEDURE — 25000003 PHARM REV CODE 250

## 2023-10-25 PROCEDURE — 25000003 PHARM REV CODE 250: Performed by: NURSE PRACTITIONER

## 2023-10-25 PROCEDURE — 25000003 PHARM REV CODE 250: Performed by: PSYCHIATRY & NEUROLOGY

## 2023-10-25 PROCEDURE — 12400001 HC PSYCH SEMI-PRIVATE ROOM

## 2023-10-25 RX ORDER — PALIPERIDONE 3 MG/1
6 TABLET, EXTENDED RELEASE ORAL DAILY
Status: DISCONTINUED | OUTPATIENT
Start: 2023-10-25 | End: 2023-10-30

## 2023-10-25 RX ORDER — METOPROLOL SUCCINATE 25 MG/1
25 TABLET, EXTENDED RELEASE ORAL DAILY
Status: DISCONTINUED | OUTPATIENT
Start: 2023-10-25 | End: 2023-11-03 | Stop reason: HOSPADM

## 2023-10-25 RX ADMIN — ACETAMINOPHEN 650 MG: 325 TABLET, FILM COATED ORAL at 02:10

## 2023-10-25 RX ADMIN — TRAZODONE HYDROCHLORIDE 100 MG: 100 TABLET ORAL at 08:10

## 2023-10-25 RX ADMIN — DIPHENHYDRAMINE HYDROCHLORIDE 50 MG: 50 CAPSULE ORAL at 09:10

## 2023-10-25 RX ADMIN — HALOPERIDOL 10 MG: 5 TABLET ORAL at 09:10

## 2023-10-25 RX ADMIN — RISPERIDONE 1 MG: 1 TABLET ORAL at 09:10

## 2023-10-25 RX ADMIN — ESCITALOPRAM OXALATE 20 MG: 10 TABLET ORAL at 09:10

## 2023-10-25 RX ADMIN — LORAZEPAM 2 MG: 1 TABLET ORAL at 09:10

## 2023-10-25 RX ADMIN — HYDROXYZINE HYDROCHLORIDE 50 MG: 50 TABLET, FILM COATED ORAL at 08:10

## 2023-10-25 RX ADMIN — DOXEPIN HYDROCHLORIDE 10 MG: 10 CAPSULE ORAL at 08:10

## 2023-10-25 RX ADMIN — LAMOTRIGINE 100 MG: 100 TABLET ORAL at 09:10

## 2023-10-25 NOTE — PLAN OF CARE
Problem: Adult Inpatient Plan of Care  Goal: Optimal Comfort and Wellbeing  Outcome: Ongoing, Progressing  Goal: Readiness for Transition of Care  Outcome: Ongoing, Progressing     Problem: Violence Risk or Actual  Goal: Anger and Impulse Control  Outcome: Ongoing, Progressing     Problem: Cognitive Impairment (Psychotic Signs/Symptoms)  Goal: Optimal Cognitive Function (Psychotic Signs/Symptoms)  Outcome: Ongoing, Progressing     Problem: Decreased Participation and Engagement (Psychotic Signs/Symptoms)  Goal: Increased Participation and Engagement (Psychotic Signs/Symptoms)  Outcome: Ongoing, Progressing     Problem: Mood Impairment (Psychotic Signs/Symptoms)  Goal: Improved Mood Symptoms (Psychotic Signs/Symptoms)  Outcome: Ongoing, Progressing     Problem: Psychomotor Impairment (Psychotic Signs/Symptoms)  Goal: Improved Psychomotor Symptoms (Psychotic Signs/Symptoms)  Outcome: Ongoing, Progressing     Problem: Fall Injury Risk  Goal: Absence of Fall and Fall-Related Injury  Outcome: Ongoing, Progressing

## 2023-10-25 NOTE — CARE UPDATE
Treatment Team    Pt seem for treatment team today with interdisciplinary team.  Pt is Anxious with Tx team. Pt reports symptoms at this time. MD changed pt meds at this time. Treatment teams goals Not met at this time. Pt DC plan is unknown at this time. DC date scheduled for 11.1.30.

## 2023-10-25 NOTE — NURSING
Sitter reports that patient is complaining of abdominal pain. Pt delusional reporting that she has been shot and that there is a snake inside of her. Reassured patient that she was safe and that she had not been shot. Tylenol 650mg po given. Will monitor for effectiveness.

## 2023-10-25 NOTE — H&P
Consult Note    Consults  SUBJECTIVE:     History of Present Illness:  Patient is a 17 y.o. female presents with elevated BP and Pulse. Onset of symptoms was gradual starting admission few days ago with unchanged course since that time. Patient denies pain. Symptoms are aggravated by none. Symptoms improve with node.    Review of patient's allergies indicates:  No Known Allergies  Past Medical History:   Diagnosis Date    ADHD     Depression     History of psychiatric hospitalization     Hypertension     Mitral valve regurgitation      No past surgical history on file.  Family History   Problem Relation Age of Onset    Diabetes Mellitus Mother     Hypertension Mother     Heart attack Paternal Grandfather      Social History     Tobacco Use    Smoking status: Never    Smokeless tobacco: Never   Substance Use Topics    Alcohol use: Never    Drug use: Never     Review of Systems   Constitutional: Negative.    HENT: Negative.     Respiratory: Negative.     Cardiovascular: Negative.    Gastrointestinal: Negative.    Genitourinary: Negative.    Musculoskeletal: Negative.    Skin: Negative.    Neurological: Negative.    Endo/Heme/Allergies: Negative.        OBJECTIVE:     Vital Signs:       Physical Exam  Vitals reviewed. Exam conducted with a chaperone present.   HENT:      Head: Normocephalic.      Mouth/Throat:      Mouth: Mucous membranes are moist.      Pharynx: Oropharynx is clear.   Cardiovascular:      Rate and Rhythm: Regular rhythm. Tachycardia present.      Heart sounds: Murmur heard.      Systolic murmur is present with a grade of 1/6.      Comments: Apical   Pulmonary:      Effort: Pulmonary effort is normal.      Breath sounds: Normal breath sounds.   Abdominal:      General: Abdomen is flat. Bowel sounds are normal.      Palpations: Abdomen is soft.   Musculoskeletal:      Cervical back: Neck supple.   Skin:     General: Skin is warm and dry.   Neurological:      Mental Status: She is alert.        Laboratory:  No results found for this or any previous visit (from the past 48 hour(s)).      Diagnostic Results:      Patient Active Problem List   Diagnosis    Mitral valve regurgitation    Hypertension    Depression    Tachycardia    Bipolar I disorder, most recent episode depressed, severe without psychotic features    Generalized anxiety disorder    Unspecified intellectual disabilities    Psychosis        ASSESSMENT/PLAN:     Start Toprol XL 50 mg  TSH 0.895  Have MD review BP & pulse at least weekly and prn

## 2023-10-25 NOTE — CARE UPDATE
Sw spoke to pt mother January [phone], who shared that pt will [place] to [place] residence [place]. [place] address is [facility] [address].

## 2023-10-25 NOTE — NURSING
PRN Medication Follow-up Note:    Behavior:    Patient (Hernando Alvarado is a 17 y.o. female, : 2005, MRN: [id number])     Allergies: Patient has no known allergies.    Hernando's  height is 5' (1.524 m) and weight is 45.7 kg (100 lb 12 oz). Her temperature is 98.8 °F (37.1 °C). Her blood pressure is 137/101 (abnormal) and her pulse is 129 (abnormal). Her respiration is 18 and oxygen saturation is 97%.     Administered Ativan 2 mg PO PRN, Haldol 10 mg PO PRN, and Benadryl 50 mg PO PRN per physician order to Hernando       Intervention:    Intervention to Hernando's response:decrease in agitation and anxiety.      Response:    Hernando's response: decrease in agitation and anxiety.      Plan:     Continue to monitor per MD/PA/APRN orders; and reevaluate medication effectiveness within 30 minutes.
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
## 2023-10-25 NOTE — NURSING
Treatment Team Note:      Behavior:    Patient (Hernando Alvarado is a 17 y.o. female, : 2005, MRN: [id number]) demonstrating an affect that was sad, flat, tearful, anxious, irritable, agitated, angry,  labile, and inappropriate. Hernando demonstrating mood that is depressed, angry, anxious, swings, and fearful. Hernando had an appearance that was disheveled and poor hygiene. Hernando denies suicidal ideation. Hernando denies suicide plan. Hernando endorses hallucinations.      Intervention:    Encourage Hernando to perform self-hygiene, grooming, and changing of clothing. Encourage Hernando to attend all scheduled groups. Monitor Hernando's behavior and program compliance. Monitor Hernando for suicidal ideation, homicidal ideation, sleep disturbance, and hallucinations. Encourage Hernando to eat all portions of meals and assess for meal preferences. Monitor Hernando for intake and output to ensure hydration. Notify the Physician/Physician Assistant/Advance Practice Registered Nurse (MD/PA/APRN) for any medication refusal and any change in patient condition.    Discussed with Hernando course of treatment. Discussed with Hernando medications ordered and schedule of medications. Discussed collateral contact with Hernando.      Response:    Hernando's response to treatment team meeting: cooperative when speaking to Dr. Gleason. Delusional AEB she continues to state she is pregnant. Her UPT is negative.       Plan:     Continue to monitor per MD/PA/APRN orders; maintain patient safety.

## 2023-10-25 NOTE — PLAN OF CARE
Hernando attends TR groups, is disruptive requiring redirection and prompting with +RIS, delusional and paranoid behaviors, with poor interactions with peers and staff, and not attending her ADL's without assistance.    Hernando attended treatment team, was cooperative but delusional with FOI and psychotic, and not progressing with her treatment goals.

## 2023-10-25 NOTE — NURSING
PRN Medication Follow-up Note:    Behavior:    Patient (Hernando Alvarado is a 17 y.o. female, : 2005, MRN: [id number])     Allergies: Patient has no known allergies.    Hernando's  height is 5' (1.524 m) and weight is 45.7 kg (100 lb 12 oz). Her temperature is 98.8 °F (37.1 °C). Her blood pressure is 137/101 (abnormal) and her pulse is 129 (abnormal). Her respiration is 18 and oxygen saturation is 97%.     Administered Tylenol 650 mg per physician order to Hernando for abdominal pain      Intervention:    Intervention to Hernando's response: Medication tolerated well.        Response:    Hernando's response: No further complaints of pain      Plan:     Continue to monitor per MD/PA/APRN orders; and reevaluate medication effectiveness within 30 minutes.
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
## 2023-10-25 NOTE — NURSING
PRN Administration Note:    Behavior:    Patient (Hernando Alvarado is a 17 y.o. female, : 2005, MRN: [id number])     Allergies: Patient has no known allergies.    Hernando's  height is 5' (1.524 m) and weight is 45.7 kg (100 lb 12 oz). Her temperature is 98.8 °F (37.1 °C). Her blood pressure is 137/101 (abnormal) and her pulse is 129 (abnormal). Her respiration is 18 and oxygen saturation is 97%.     Reason for PRN Administration: non redirectable agitation. Psychotic. Screaming that she had a snake in her head and it was painful.    Intervention:    Administered Haldol 10 mg PO PRN, Ativan 2 mg PO PRN, and Benadryl 50 mg PO PRN per physician order to Hernando       Response:    Hernando tolerated administration well.      Plan:     Continue to monitor per MD/PA/APRN orders; and reevaluate medication effectiveness within 30 minutes.

## 2023-10-26 PROCEDURE — 12400001 HC PSYCH SEMI-PRIVATE ROOM

## 2023-10-26 PROCEDURE — 25000003 PHARM REV CODE 250: Performed by: PSYCHIATRY & NEUROLOGY

## 2023-10-26 PROCEDURE — 25000003 PHARM REV CODE 250: Performed by: NURSE PRACTITIONER

## 2023-10-26 PROCEDURE — 25000003 PHARM REV CODE 250: Performed by: FAMILY MEDICINE

## 2023-10-26 PROCEDURE — 25000003 PHARM REV CODE 250

## 2023-10-26 RX ADMIN — LORAZEPAM 2 MG: 1 TABLET ORAL at 09:10

## 2023-10-26 RX ADMIN — LAMOTRIGINE 100 MG: 100 TABLET ORAL at 09:10

## 2023-10-26 RX ADMIN — HYDROXYZINE HYDROCHLORIDE 50 MG: 50 TABLET, FILM COATED ORAL at 02:10

## 2023-10-26 RX ADMIN — HALOPERIDOL 10 MG: 5 TABLET ORAL at 09:10

## 2023-10-26 RX ADMIN — PALIPERIDONE 6 MG: 3 TABLET, EXTENDED RELEASE ORAL at 10:10

## 2023-10-26 RX ADMIN — ACETAMINOPHEN 650 MG: 325 TABLET, FILM COATED ORAL at 02:10

## 2023-10-26 RX ADMIN — ESCITALOPRAM OXALATE 20 MG: 10 TABLET ORAL at 10:10

## 2023-10-26 RX ADMIN — METOPROLOL SUCCINATE 25 MG: 25 TABLET, EXTENDED RELEASE ORAL at 10:10

## 2023-10-26 RX ADMIN — TRAZODONE HYDROCHLORIDE 100 MG: 100 TABLET ORAL at 08:10

## 2023-10-26 RX ADMIN — DOXEPIN HYDROCHLORIDE 10 MG: 10 CAPSULE ORAL at 08:10

## 2023-10-26 RX ADMIN — DIPHENHYDRAMINE HYDROCHLORIDE 50 MG: 50 CAPSULE ORAL at 09:10

## 2023-10-26 NOTE — PLAN OF CARE
Problem: Violence Risk or Actual  Goal: Anger and Impulse Control  Outcome: Ongoing, Not Progressing     Problem: Cognitive Impairment (Psychotic Signs/Symptoms)  Goal: Optimal Cognitive Function (Psychotic Signs/Symptoms)  Outcome: Ongoing, Not Progressing     Problem: Decreased Participation and Engagement (Psychotic Signs/Symptoms)  Goal: Increased Participation and Engagement (Psychotic Signs/Symptoms)  Outcome: Ongoing, Not Progressing     Problem: Mood Impairment (Psychotic Signs/Symptoms)  Goal: Improved Mood Symptoms (Psychotic Signs/Symptoms)  Outcome: Ongoing, Not Progressing     Problem: Psychomotor Impairment (Psychotic Signs/Symptoms)  Goal: Improved Psychomotor Symptoms (Psychotic Signs/Symptoms)  Outcome: Ongoing, Not Progressing     Problem: Fall Injury Risk  Goal: Absence of Fall and Fall-Related Injury  Outcome: Ongoing, Not Progressing     Problem: Adult Inpatient Plan of Care  Goal: Plan of Care Review  Outcome: Met  Goal: Patient-Specific Goal (Individualized)  Outcome: Met  Goal: Absence of Hospital-Acquired Illness or Injury  Outcome: Met  Goal: Optimal Comfort and Wellbeing  Outcome: Met  Goal: Readiness for Transition of Care  Outcome: Met

## 2023-10-26 NOTE — NURSING
PRN Medication Follow-up Note:    Behavior:    Patient (Hernando Alvarado is a 17 y.o. female, : 2005, MRN: [id number])     Allergies: Patient has no known allergies.    Hernando's  height is 5' (1.524 m) and weight is 45.7 kg (100 lb 12 oz). Her temperature is 98.6 °F (37 °C). Her blood pressure is 152/110 (abnormal) and her pulse is 108. Her respiration is 18 and oxygen saturation is 97%.     Administered trazodone 100 mg po per physician order to Hernando       Intervention:    Intervention to Hernando's response: pt tolerated well.       Response:    Hernando's response: effective      Plan:     Continue to monitor per MD/PA/APRN orders; and reevaluate medication effectiveness within 30 minutes.

## 2023-10-26 NOTE — PROGRESS NOTES
"10/26/2023  Hernando Alvarado   2005   [id number]        Psychiatry Progress Note     Chief Complaint: "I'm bleeding from my abdomen"    SUBJECTIVE:   Hernando Alvarado is a 17 y.o. female  placed under a PEC at [facility] due to delusions that she is pregnant and that she was producing milk.    Today patient states that she is feeling "Hurting". She states that she hurt her ankle at school. She then states that "My head hurts, daddy."  Attends groups but poor participation and is very distractive.  Continues to be very emotional reactive.  Patient was started on Invega yesterday and is tolerating this well. Will continue to monitor and if appropriate begin Invega Sustenna on Sunday. Will continue with other medications as prescribed.     UDS: (-)  Blood alcohol: <10      Current Medications:   Scheduled Meds:    doxepin  10 mg Oral QHS    EScitalopram oxalate  20 mg Oral Daily    lamoTRIgine  100 mg Oral Daily    metoprolol succinate  25 mg Oral Daily    paliperidone  6 mg Oral Daily      PRN Meds: acetaminophen, aluminum-magnesium hydroxide-simethicone, haloperidoL **AND** diphenhydrAMINE **AND** LORazepam **AND** [DISCONTINUED] haloperidol lactate **AND** diphenhydrAMINE **AND** lorazepam, hydrOXYzine HCL, traZODone, ziprasidone   Psychotherapeutics (From admission, onward)      Start     Stop Route Frequency Ordered    10/25/23 1145  paliperidone 24 hr tablet 6 mg         -- Oral Daily 10/25/23 1037    10/24/23 2100  doxepin capsule 10 mg         -- Oral Nightly 10/24/23 1013    10/22/23 1200  EScitalopram oxalate tablet 20 mg         -- Oral Daily 10/22/23 1046    10/21/23 2338  ziprasidone injection 20 mg         -- IM Every 4 hours PRN 10/21/23 2240    10/20/23 1529  haloperidoL tablet 10 mg  (Med - Acute  Behavioral Management)        See Hyperspace for full Linked Orders Report.    -- Oral Every 4 hours PRN 10/20/23 1529    10/20/23 1529  LORazepam tablet 2 mg  (Med - Acute  Behavioral Management)      "   See Hyperspace for full Linked Orders Report.    -- Oral Every 4 hours PRN 10/20/23 1529    10/20/23 1529  LORazepam injection 2 mg  (Med - Acute  Behavioral Management)        See Hyperspace for full Linked Orders Report.    -- IM Every 4 hours PRN 10/20/23 1529    10/20/23 1529  traZODone tablet 100 mg         -- Oral Nightly PRN 10/20/23 1529            Allergies:   Review of patient's allergies indicates:  No Known Allergies     OBJECTIVE:   Vitals   Vitals:    10/26/23 1004   BP: (!) 130/94   Pulse:    Resp:    Temp:         Labs/Imaging/Studies:   No results found for this or any previous visit (from the past 36 hour(s)).       Medical Review Of Systems:  Constitutional: negative  Respiratory: negative  Cardiovascular: negative  Gastrointestinal: negative  Genitourinary:negative  Musculoskeletal:negative  Neurological: negative       Psychiatric Mental Status Exam:  General Appearance: appears stated age, well-developed, well-nourished  Arousal: alert  Behavior: labile at times  Movements and Motor Activity: no abnormal involuntary movements noted  Orientation: oriented to person, place, time, and situation  Speech: normal rate, normal rhythm, normal volume, normal tone  Mood: Dysphoric  Affect: emotionally reactive  Thought Process: tangential, disorganzied  Associations: loose  Thought Content and Perceptions: (+)hallucinations, (+)Delusions, no suicidal ideation, no homicidal ideation, no auditory hallucinations, no visual hallucinations, no paranoid ideation, no ideas of reference  Recent and Remote Memory: recent memory intact, remote memory intact; per interview/observation with patient  Attention and Concentration: limited, attentive to conversation; per interview/observation with patient  Fund of Knowledge: limited, aware of current events, vocabulary appropriate; based on history, vocabulary, fund of knowledge, syntax, grammar, and content  Insight: limited; based on understanding of severity of  illness and HPI  Judgment: limited; based on patient's behavior and HPI      ASSESSMENT/PLAN:   Problems Addressed/Diagnoses:  Bipolar Disorder, most recent episode depressed, severe (F31.4)  Generalized Anxiety Disorder (F41.1)  Intellectual Disability (F79)    R/o Schizoaffective disorder    Past Medical History:   Diagnosis Date    ADHD     Depression     History of psychiatric hospitalization     Hypertension     Mitral valve regurgitation         Plan:  Bipolar disorder, acute  -D/c Risperdal  -Invega 6mg daily  -Continue Lamictal     Anxiety, acute  -Continue Lexapro 20 mg daily     Intellectual disability  -Group/Individual psychotherapy       Expected Disposition Plan: Placement needed        ROB Delgado-BC
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
## 2023-10-26 NOTE — NURSING
Daily Nursing Note:      Behavior:    Patient (Hernando Alvarado is a 17 y.o. female, : 2005, MRN: [id number]) demonstrating an affect that was sad, flat, tearful, anxious, irritable, agitated, angry, expansive,  labile, and inappropriate. Hernando demonstrating mood that is depressed, angry, anxious, swings, and fearful. Hernando had an appearance that was clean. Hernando denies suicidal ideation. Hernando denies suicide plan. Hernando denies homicidal ideation. Hernando endorses hallucinations.    Hernando's  height is 5' (1.524 m) and weight is 45.7 kg (100 lb 12 oz). Her temperature is 98.2 °F (36.8 °C). Her blood pressure is 130/84 and her pulse is 120 (abnormal). Her respiration is 18 and oxygen saturation is 97%.     Sameeras last BM was noted on: 10/25/23.      Intervention:    Encourage Hernando to perform self-hygiene, grooming, and changing of clothing. Monitor Hernando's behavior and program compliance. Monitor Hernando for suicidal ideation, homicidal ideation, sleep disturbance, and hallucinations. Encourage Hernando to eat all portions of meals and assess for meal preferences. Monitor Hernando for intake and output to ensure hydration. Notify the Physician/Physician Assistant/Advance Practice Registered Nurse (MD/PA/APRN) for any medication refusal and any change in patient condition.      Response:    Hernando verbalizes understand of unit process and procedures.       Plan:     Continue to monitor per MD/PA/APRN orders; maintain patient safety.

## 2023-10-26 NOTE — NURSING
Daily Nursing Note:      Behavior:    Patient (Hernando Alvarado is a 17 y.o. female, : 2005, MRN: [id number]) demonstrating an affect that was flat, tearful, and anxious. Hernando demonstrating mood that is depressed and anxious. Hernando had an appearance that was disheveled. Hernando denies suicidal ideation. Hernando denies suicide plan. Hernando denies homicidal ideation. Hernando endorses hallucinations.    Hernando's  height is 5' (1.524 m) and weight is 45.7 kg (100 lb 12 oz). Her temperature is 98.6 °F (37 °C). Her blood pressure is 152/110 (abnormal) and her pulse is 108. Her respiration is 18 and oxygen saturation is 97%.       Intervention:    Encourage Hernando to perform self-hygiene, grooming, and changing of clothing. Monitor Hernando's behavior and program compliance. Monitor Hernando for suicidal ideation, homicidal ideation, sleep disturbance, and hallucinations. Encourage Hernando to eat all portions of meals and assess for meal preferences. Monitor Hernando for intake and output to ensure hydration. Notify the Physician/Physician Assistant/Advance Practice Registered Nurse (MD/PA/APRN) for any medication refusal and any change in patient condition.      Response:    Hernando verbalizes understand of unit process and procedures.      Plan:     Continue to monitor per MD/PA/APRN orders; maintain patient safety.

## 2023-10-26 NOTE — NURSING
PRN Administration Note:    Behavior:    Patient (Hernando Alvarado is a 17 y.o. female, : 2005, MRN: [id number])     Allergies: Patient has no known allergies.    Hernando's  height is 5' (1.524 m) and weight is 45.7 kg (100 lb 12 oz). Her temperature is 98.6 °F (37 °C). Her blood pressure is 152/110 (abnormal) and her pulse is 108. Her respiration is 18 and oxygen saturation is 97%.     Reason for PRN Administration: Insomnia.    Intervention:    Administered Trazodone 100mg po per physician order to Hernando       Response:    Hernando tolerated administration well.      Plan:     Continue to monitor per MD/PA/APRN orders; and reevaluate medication effectiveness within 30 minutes.

## 2023-10-26 NOTE — PROGRESS NOTES
Inpatient Nutrition Evaluation    Admit Date: 10/20/2023   Total duration of encounter: 6 days    Nutrition Recommendation/Prescription     Will adjust diet to regular portions  Monitor po intake, wt    Nutrition Assessment     Chart Review    Reason Seen: length of stay    Malnutrition Screening Tool Results                Diagnosis: Bipolar Disorder, most recent episode depressed, severe   Generalized Anxiety Disorder  Intellectual Disability  R/o Schizoaffective disorder     Relevant Medical History: ADHD, depression, HTN, mitral valve regurgitation    Nutrition-Related Medications: No nutritional meds noted    Nutrition-Related Labs: 10/26-No recent meds noted      Diet Order: Diet Adult Regular Double Portions  Oral Supplement Order: none  Appetite/Oral Intake: good/25-50% of meals  Factors Affecting Nutritional Intake: none identified  Food/[place]ism/Cultural Preferences: unable to obtain  Food Allergies: no known food allergies       Wound(s):   None noted    Comments  10/26: Pt present with 1:1 sitter. Pt unable to answer questions appropriately. Sitter reports pt wasting meals d/t large portions -- will adjust diet. Unable to obtain UBW and intake/wt hx at this time; will attempt to obtain more information on f/up if able.      Anthropometrics    Height: 5' (152.4 cm) Height Method: Measured  Last Weight: 45.7 kg (100 lb 12 oz) (10/20/23 1608) Weight Method: Standard Scale  BMI (Calculated): 19.7  BMI Classification: normal (BMI 18.5-24.9)     Ideal Body Weight (IBW), Female: 100 lb     % Ideal Body Weight, Female (lb): 100.75 %                    Usual Body Weight (UBW), kg:  (Unable to obtain)        Usual Weight Provided By: unable to obtain usual weight    Wt Readings from Last 5 Encounters:   10/20/23 45.7 kg (100 lb 12 oz) (4 %, Z= -1.75)*   10/20/23 45.2 kg (99 lb 9.6 oz) (3 %, Z= -1.85)*   10/11/23 45.2 kg (99 lb 9.6 oz) (3 %, Z= -1.85)*   10/10/23 52.2 kg (115 lb) (24 %, Z= -0.71)*   09/09/23 45.4  kg (100 lb) (3 %, Z= -1.82)*     * Growth percentiles are based on CDC (Girls, 2-20 Years) data.     Weight Change(s) Since Admission:  Admit Weight: 45.7 kg (100 lb 12 oz) (10/20/23 1530)  10/26: Unable to obtain UBW    Patient Education    Not applicable.    Monitoring & Evaluation     Dietitian will monitor food and beverage intake and weight.  Nutrition Risk/Follow-Up: low (follow-up in 5-7 days)  Patients assigned 'low nutrition risk' status do not qualify for a full nutritional assessment but will be monitored and re-evaluated in a 5-7 day time period. Please consult if re-evaluation needed sooner.

## 2023-10-26 NOTE — NURSING
PRN Administration Note:    Behavior:    Patient (Hernando Alvarado is a 17 y.o. female, : 2005, MRN: [id number])     Allergies: Patient has no known allergies.    Hernando's  height is 5' (1.524 m) and weight is 45.7 kg (100 lb 12 oz). Her temperature is 98.6 °F (37 °C). Her blood pressure is 152/110 (abnormal) and her pulse is 108. Her respiration is 18 and oxygen saturation is 97%.     Reason for PRN Administration: headache and anxiety.    Intervention:    Administered tylenol 650 mg po and atarax 50 mg po per physician order to Hernando       Response:    Hernando tolerated administration well.      Plan:     Continue to monitor per MD/PA/APRN orders; and reevaluate medication effectiveness within 30 minutes.

## 2023-10-26 NOTE — CARE UPDATE
Sw received call from pt mother asking if pt was being transferred to another facility because another pt that was dc on 10.25.2023 called her and told her; her daughter would be being transferred. Pt mother did not want to disclose the name of this person.

## 2023-10-26 NOTE — NURSING
PRN Medication Follow-up Note:    Behavior:    Patient (Hernando Alvarado is a 17 y.o. female, : 2005, MRN: [id number])     Allergies: Patient has no known allergies.    Hernando's  height is 5' (1.524 m) and weight is 45.7 kg (100 lb 12 oz). Her temperature is 98.6 °F (37 °C). Her blood pressure is 152/110 (abnormal) and her pulse is 108. Her respiration is 18 and oxygen saturation is 97%.     Administered tylenol 650 mg po and atarax 50 mg poper physician order to Hernando       Intervention:    Intervention to Hernando's response: pt tolerated well.       Response:    Hernando's response: effective      Plan:     Continue to monitor per MD/PA/APRN orders; and reevaluate medication effectiveness within 30 minutes.

## 2023-10-27 PROCEDURE — 25000003 PHARM REV CODE 250: Performed by: FAMILY MEDICINE

## 2023-10-27 PROCEDURE — 12400001 HC PSYCH SEMI-PRIVATE ROOM

## 2023-10-27 PROCEDURE — 25000003 PHARM REV CODE 250: Performed by: PSYCHIATRY & NEUROLOGY

## 2023-10-27 PROCEDURE — 25000003 PHARM REV CODE 250: Performed by: NURSE PRACTITIONER

## 2023-10-27 PROCEDURE — 25000003 PHARM REV CODE 250

## 2023-10-27 RX ADMIN — METOPROLOL SUCCINATE 25 MG: 25 TABLET, EXTENDED RELEASE ORAL at 08:10

## 2023-10-27 RX ADMIN — DIPHENHYDRAMINE HYDROCHLORIDE 50 MG: 50 CAPSULE ORAL at 04:10

## 2023-10-27 RX ADMIN — HALOPERIDOL 10 MG: 5 TABLET ORAL at 04:10

## 2023-10-27 RX ADMIN — LAMOTRIGINE 100 MG: 100 TABLET ORAL at 08:10

## 2023-10-27 RX ADMIN — TRAZODONE HYDROCHLORIDE 100 MG: 100 TABLET ORAL at 08:10

## 2023-10-27 RX ADMIN — PALIPERIDONE 6 MG: 3 TABLET, EXTENDED RELEASE ORAL at 08:10

## 2023-10-27 RX ADMIN — ESCITALOPRAM OXALATE 20 MG: 10 TABLET ORAL at 08:10

## 2023-10-27 RX ADMIN — LORAZEPAM 2 MG: 1 TABLET ORAL at 04:10

## 2023-10-27 RX ADMIN — DOXEPIN HYDROCHLORIDE 10 MG: 10 CAPSULE ORAL at 08:10

## 2023-10-27 NOTE — NURSING
PRN Administration Note:    Behavior:    Patient (Hernando Alvarado is a 17 y.o. female, : 2005, MRN: [id number])     Allergies: Patient has no known allergies.    Hernando's  height is 5' (1.524 m) and weight is 45.7 kg (100 lb 12 oz). Her temperature is 98.2 °F (36.8 °C). Her blood pressure is 130/94 (abnormal) and her pulse is 120 (abnormal). Her respiration is 18 and oxygen saturation is 97%.     Reason for PRN Administration: Insomnia.    Intervention:    Administered trazodone 100mg pop physician order to Hernando       Response:    Hernando tolerated administration well.      Plan:     Continue to monitor per MD/PA/APRN orders; and reevaluate medication effectiveness within 30 minutes.

## 2023-10-27 NOTE — PLAN OF CARE
Problem: Violence Risk or Actual  Goal: Anger and Impulse Control  Outcome: Ongoing, Not Progressing     Problem: Cognitive Impairment (Psychotic Signs/Symptoms)  Goal: Optimal Cognitive Function (Psychotic Signs/Symptoms)  Outcome: Ongoing, Not Progressing     Problem: Decreased Participation and Engagement (Psychotic Signs/Symptoms)  Goal: Increased Participation and Engagement (Psychotic Signs/Symptoms)  Outcome: Ongoing, Not Progressing     Problem: Mood Impairment (Psychotic Signs/Symptoms)  Goal: Improved Mood Symptoms (Psychotic Signs/Symptoms)  Outcome: Ongoing, Not Progressing     Problem: Psychomotor Impairment (Psychotic Signs/Symptoms)  Goal: Improved Psychomotor Symptoms (Psychotic Signs/Symptoms)  Outcome: Ongoing, Not Progressing     Problem: Fall Injury Risk  Goal: Absence of Fall and Fall-Related Injury  Outcome: Ongoing, Not Progressing

## 2023-10-27 NOTE — NURSING
PRN Administration Note:    Behavior:    Patient (Hernando Alvarado is a 17 y.o. female, : 2005, MRN: [id number])     Allergies: Patient has no known allergies.    Hernando's  height is 5' (1.524 m) and weight is 45.7 kg (100 lb 12 oz). Her temperature is 97.4 °F (36.3 °C). Her blood pressure is 107/57 (abnormal) and her pulse is 80. Her respiration is 18 and oxygen saturation is 97%.     Reason for PRN Administration: nondirectable behavioral _______.    Intervention:    Administered __B52 P.O._____ per physician order to Hernando       Response:    Hernando tolerated administration well.      Plan:     Continue to monitor per MD/PA/APRN orders; and reevaluate medication effectiveness within 30 minutes.

## 2023-10-27 NOTE — PLAN OF CARE
Problem: Violence Risk or Actual  Goal: Anger and Impulse Control  Outcome: Ongoing, Progressing     Problem: Cognitive Impairment (Psychotic Signs/Symptoms)  Goal: Optimal Cognitive Function (Psychotic Signs/Symptoms)  Outcome: Ongoing, Progressing     Problem: Decreased Participation and Engagement (Psychotic Signs/Symptoms)  Goal: Increased Participation and Engagement (Psychotic Signs/Symptoms)  Outcome: Ongoing, Progressing     Problem: Mood Impairment (Psychotic Signs/Symptoms)  Goal: Improved Mood Symptoms (Psychotic Signs/Symptoms)  Outcome: Ongoing, Progressing     Problem: Psychomotor Impairment (Psychotic Signs/Symptoms)  Goal: Improved Psychomotor Symptoms (Psychotic Signs/Symptoms)  Outcome: Ongoing, Progressing     Problem: Fall Injury Risk  Goal: Absence of Fall and Fall-Related Injury  Outcome: Ongoing, Progressing

## 2023-10-27 NOTE — NURSING
PRN Medication Follow-up Note:    Behavior:    Patient (Hernando Alvarado is a 17 y.o. female, : 2005, MRN: [id number])     Allergies: Patient has no known allergies.    Hernando's  height is 5' (1.524 m) and weight is 45.7 kg (100 lb 12 oz). Her temperature is 98.2 °F (36.8 °C). Her blood pressure is 130/94 (abnormal) and her pulse is 120 (abnormal). Her respiration is 18 and oxygen saturation is 97%.     Administered trazodone 100 mg po per physician order to Hernando       Intervention:    Intervention to Hernando's response: pt tolerated well       Response:    Hernando's response: effective      Plan:     Continue to monitor per MD/PA/APRN orders; and reevaluate medication effectiveness within 30 minutes.

## 2023-10-27 NOTE — GROUP NOTE
Group Psychotherapy       Group Focus: Stress Management    Group Topic: Stress Management/Crisis Plan. Therapist assisted with understanding of treatment plan, identification of responsibilities for actions, assisted patients in identifying sources of support and developing awareness of crisis symptoms.    Number of patients in attendance: 7    Group Start Time: 1045  Group End Time:  1130  Groups Date: 10/27/2023  Group Topic:  Behavioral Health  Group Department: [facility]  Group Facilitators:  Ashley Villalba  _____________________________________________________________________    Patient Name: Hernando Alvarado  MRN: [id number]  Patient Class: IP- Psych   Admission Date\Time: 10/20/2023  3:27 PM  Hospital Length of Stay: 7  Primary Care Provider: Carmen, Primary Doctor     Referred by: Acute Psychiatry Unit Treatment Team     Target symptoms: Psychosis     Patient's response to treatment: Pt did not attend group alternative given     Progress toward goals: Minimal progress     Interval History:      Diagnosis:      Plan: Continue treatment on APU

## 2023-10-27 NOTE — NURSING
Daily Nursing Note:      Behavior:    Patient (Hernando Alvarado is a 17 y.o. female, : 2005, MRN: [id number]) demonstrating an affect that was anxious and  labile. Hernando demonstrating mood that is anxious and swings. Hernando had an appearance that was disheveled. Hernando denies suicidal ideation. Hernando denies suicide plan. Hernando denies homicidal ideation. Hernando endorses hallucinations.    Hernando's  height is 5' (1.524 m) and weight is 45.7 kg (100 lb 12 oz). Her temperature is 98.2 °F (36.8 °C). Her blood pressure is 130/94 (abnormal) and her pulse is 120 (abnormal). Her respiration is 18 and oxygen saturation is 97%.       Intervention:    Encourage Hernando to perform self-hygiene, grooming, and changing of clothing. Monitor Hernando's behavior and program compliance. Monitor Hernando for suicidal ideation, homicidal ideation, sleep disturbance, and hallucinations. Encourage Hernando to eat all portions of meals and assess for meal preferences. Monitor Hernando for intake and output to ensure hydration. Notify the Physician/Physician Assistant/Advance Practice Registered Nurse (MD/PA/APRN) for any medication refusal and any change in patient condition.      Response:    Hernando verbalizes understand of unit process and procedures.       Plan:     Continue to monitor per MD/PA/APRN orders; maintain patient safety.

## 2023-10-27 NOTE — NURSING
PRN Medication Follow-up Note:    Behavior:    Patient (Hernando Alvarado is a 17 y.o. female, : 2005, MRN: [id number])     Allergies: Patient has no known allergies.    Hernando's  height is 5' (1.524 m) and weight is 45.7 kg (100 lb 12 oz). Her temperature is 97.4 °F (36.3 °C). Her blood pressure is 107/57 (abnormal) and her pulse is 80. Her respiration is 18 and oxygen saturation is 97%.     Administered B52 P.O______ per physician order to Hernando       Intervention:          Response:    Hernando's response: refused @ first then compliant.________      Plan:     Continue to monitor per MD/PA/APRN orders; and reevaluate medication effectiveness within 30 minutes.

## 2023-10-27 NOTE — NURSING
PRN Medication Follow-up Note:    Behavior:    Patient (Hernando Alvarado is a 17 y.o. female, : 2005, MRN: [id number])     Allergies: Patient has no known allergies.    Hernando's  height is 5' (1.524 m) and weight is 45.7 kg (100 lb 12 oz). Her temperature is 98.2 °F (36.8 °C). Her blood pressure is 130/94 (abnormal) and her pulse is 120 (abnormal). Her respiration is 18 and oxygen saturation is 97%.     Administered Ativan 2mg po, Haldol 10mg po, benadryl 50mg po per physician order to Hernando       Intervention:    Intervention to Hernando's response: pt tolerated well.       Response:    Hernando's response: effective      Plan:     Continue to monitor per MD/PA/APRN orders; and reevaluate medication effectiveness within 30 minutes.

## 2023-10-27 NOTE — NURSING
Daily Nursing Note:      Behavior:    Patient (Hernando Alvarado is a 17 y.o. female, : 2005, MRN: [id number]) demonstrating an affect that was congruent. Hernando demonstrating mood that is swings. Hernando had an appearance that was poor hygiene. Hernando denies suicidal ideation. Hernando denies suicide plan. Hernando denies homicidal ideation. Hernando denies hallucinations.    Hernando's  height is 5' (1.524 m) and weight is 45.7 kg (100 lb 12 oz). Her temperature is 97.4 °F (36.3 °C). Her blood pressure is 107/57 (abnormal) and her pulse is 80. Her respiration is 18 and oxygen saturation is 97%.     Hernando's last BM was noted on: 10/27/23_______      Intervention:    Encourage Hernando to perform self-hygiene, grooming, and changing of clothing. Monitor Hernando's behavior and program compliance. Monitor Hernando for suicidal ideation, homicidal ideation, sleep disturbance, and hallucinations. Encourage Hernando to eat all portions of meals and assess for meal preferences. Monitor Hernando for intake and output to ensure hydration. Notify the Physician/Physician Assistant/Advance Practice Registered Nurse (MD/PA/APRN) for any medication refusal and any change in patient condition.      Response:        Plan:     Continue to monitor per MD/PA/APRN orders; maintain patient safety.

## 2023-10-27 NOTE — NURSING
PRN Administration Note:    Behavior:    Patient (Hernando Alvarado is a 17 y.o. female, : 2005, MRN: [id number])     Allergies: Patient has no known allergies.    Hernando's  height is 5' (1.524 m) and weight is 45.7 kg (100 lb 12 oz). Her temperature is 98.2 °F (36.8 °C). Her blood pressure is 130/94 (abnormal) and her pulse is 120 (abnormal). Her respiration is 18 and oxygen saturation is 97%.     Reason for PRN Administration: Severe Agitation.    Intervention:    Administered Ativan 2mg po, Benadryl 50mg po, haldol 10mg po per physician order to Hernando       Response:    Hernando tolerated administration well.      Plan:     Continue to monitor per MD/PA/APRN orders; and reevaluate medication effectiveness within 30 minutes.

## 2023-10-28 PROCEDURE — 25000003 PHARM REV CODE 250: Performed by: NURSE PRACTITIONER

## 2023-10-28 PROCEDURE — 25000003 PHARM REV CODE 250: Performed by: FAMILY MEDICINE

## 2023-10-28 PROCEDURE — 12400001 HC PSYCH SEMI-PRIVATE ROOM

## 2023-10-28 PROCEDURE — 25000003 PHARM REV CODE 250

## 2023-10-28 PROCEDURE — 25000003 PHARM REV CODE 250: Performed by: PSYCHIATRY & NEUROLOGY

## 2023-10-28 RX ADMIN — LORAZEPAM 2 MG: 1 TABLET ORAL at 03:10

## 2023-10-28 RX ADMIN — DOXEPIN HYDROCHLORIDE 10 MG: 10 CAPSULE ORAL at 08:10

## 2023-10-28 RX ADMIN — HALOPERIDOL 10 MG: 5 TABLET ORAL at 03:10

## 2023-10-28 RX ADMIN — PALIPERIDONE 6 MG: 3 TABLET, EXTENDED RELEASE ORAL at 08:10

## 2023-10-28 RX ADMIN — ESCITALOPRAM OXALATE 20 MG: 10 TABLET ORAL at 08:10

## 2023-10-28 RX ADMIN — METOPROLOL SUCCINATE 25 MG: 25 TABLET, EXTENDED RELEASE ORAL at 08:10

## 2023-10-28 RX ADMIN — DIPHENHYDRAMINE HYDROCHLORIDE 50 MG: 50 CAPSULE ORAL at 03:10

## 2023-10-28 RX ADMIN — LAMOTRIGINE 100 MG: 100 TABLET ORAL at 08:10

## 2023-10-28 RX ADMIN — TRAZODONE HYDROCHLORIDE 100 MG: 100 TABLET ORAL at 08:10

## 2023-10-28 RX ADMIN — HYDROXYZINE HYDROCHLORIDE 50 MG: 50 TABLET, FILM COATED ORAL at 08:10

## 2023-10-28 NOTE — NURSING
Patient alert  and oriented x2  but with confusion , easily redirected with one on one in place. Noted coining words at times  and dancing  but able to follow instructions when redirected. Eat 50% for breakfast and 90% for lunch.Voided x1 since the start of shift. All medications accepted as scheduled.       No data to display

## 2023-10-28 NOTE — NURSING
Patient noted to be hyperactive, dancing and with noted psychomotor agitation,,manic state.  Not easily redirected with the one on one assigned.  Given Benadryl 50 mg PO , PRN , Haldol 10 mg PO ,PRN and Ativan 2 mg PO ,PRN . Medications taken orally  and tolerated well.

## 2023-10-28 NOTE — NURSING
Daily Nursing Note:      Behavior:    Patient (Hernando Alvarado is a 17 y.o. female, : 2005, MRN: [id number]) demonstrating an affect that was anxious and  labile. Hernando demonstrating mood that is anxious and swings. Hernando had an appearance that was disheveled. Hernando denies suicidal ideation. Hernando denies suicide plan. Hernando denies homicidal ideation. Hernando endorses hallucinations.    Hernando's  height is 5' (1.524 m) and weight is 45.7 kg (100 lb 12 oz). Her temperature is 97.4 °F (36.3 °C). Her blood pressure is 107/57 (abnormal) and her pulse is 80. Her respiration is 18 and oxygen saturation is 97%.         Intervention:    Encourage Hernando to perform self-hygiene, grooming, and changing of clothing. Monitor Hernando's behavior and program compliance. Monitor Hernando for suicidal ideation, homicidal ideation, sleep disturbance, and hallucinations. Encourage Hernando to eat all portions of meals and assess for meal preferences. Monitor Hernando for intake and output to ensure hydration. Notify the Physician/Physician Assistant/Advance Practice Registered Nurse (MD/PA/APRN) for any medication refusal and any change in patient condition.      Response:    Hernando verbalizes understand of unit process and procedures.      Plan:     Continue to monitor per MD/PA/APRN orders; maintain patient safety.

## 2023-10-28 NOTE — NURSING
Patient medications given at 3:07 pm Benadryl 50 mg PRN, Ativan 2 mg po ,PRN and Haldol 10 mg ,PO ,PRN effective . Patient ushered by one on one personel to room , patient stated she is feeling sleepy at the moment and wanted to sleep.

## 2023-10-29 PROCEDURE — 25000003 PHARM REV CODE 250: Performed by: NURSE PRACTITIONER

## 2023-10-29 PROCEDURE — 25000003 PHARM REV CODE 250

## 2023-10-29 PROCEDURE — 25000003 PHARM REV CODE 250: Performed by: PSYCHIATRY & NEUROLOGY

## 2023-10-29 PROCEDURE — 12400001 HC PSYCH SEMI-PRIVATE ROOM

## 2023-10-29 RX ADMIN — TRAZODONE HYDROCHLORIDE 100 MG: 100 TABLET ORAL at 08:10

## 2023-10-29 RX ADMIN — HYDROXYZINE HYDROCHLORIDE 50 MG: 50 TABLET, FILM COATED ORAL at 10:10

## 2023-10-29 RX ADMIN — ACETAMINOPHEN 650 MG: 325 TABLET, FILM COATED ORAL at 09:10

## 2023-10-29 RX ADMIN — ESCITALOPRAM OXALATE 20 MG: 10 TABLET ORAL at 08:10

## 2023-10-29 RX ADMIN — DOXEPIN HYDROCHLORIDE 10 MG: 10 CAPSULE ORAL at 08:10

## 2023-10-29 RX ADMIN — PALIPERIDONE 6 MG: 3 TABLET, EXTENDED RELEASE ORAL at 08:10

## 2023-10-29 RX ADMIN — LAMOTRIGINE 100 MG: 100 TABLET ORAL at 08:10

## 2023-10-29 NOTE — NURSING
PRN Administration Note:    Behavior:    Patient (Hernando Alvarado is a 17 y.o. female, : 2005, MRN: [id number])     Allergies: Patient has no known allergies.    Hernando's  height is 5' (1.524 m) and weight is 45.7 kg (100 lb 12 oz). Her temperature is 97.7 °F (36.5 °C). Her blood pressure is 108/73 and her pulse is 96. Her respiration is 18 and oxygen saturation is 96%.     Reason for PRN Administration: anxiety and insomnia.    Intervention:    Administered trazodone 100 mg and hydroxyzine 50 mg po per physician order to Hernando       Response:    Hernando tolerated administration well.      Plan:     Continue to monitor per MD/PA/APRN orders; and reevaluate medication effectiveness within 30 minutes.

## 2023-10-29 NOTE — NURSING
PRN Medication Follow-up Note:    Behavior:    Patient (Hernando Alvarado is a 17 y.o. female, : 2005, MRN: [id number])     Allergies: Patient has no known allergies.    Hernando's  height is 5' (1.524 m) and weight is 45.7 kg (100 lb 12 oz). Her temperature is 97.7 °F (36.5 °C). Her blood pressure is 108/73 and her pulse is 96. Her respiration is 18 and oxygen saturation is 96%.     Administered hydroxyzine 50 mg po and trazodone 100mg po per physician order to Hernando       Intervention:    Intervention to Hernando's response: pt tolerated well  Response:    Hernando's response: effective      Plan:     Continue to monitor per MD/PA/APRN orders; and reevaluate medication effectiveness within 30 minutes.

## 2023-10-29 NOTE — NURSING
Medication Atarax given at 1036 am effective for anxiety. Patient at this time eating at the dinning room.

## 2023-10-29 NOTE — NURSING
"Patient is disruptive with the group located at the dinning room /lounge area as verbalized by the one on one staff assigned to patient, she is being loud and hard to be redirected.  Patient stated " my anxiety is going bad and cant control my rolling of hands like this"  Patient noted rolling hand in a dancing motion. Given Atarax 50 mg PO anxiety.Will continue to monitor.  "

## 2023-10-29 NOTE — NURSING
Daily Nursing Note:      Behavior:    Patient (Hernando Alvarado is a 17 y.o. female, : 2005, MRN: [id number]) demonstrating an affect that was expansive. Hernando demonstrating mood that is anxious and swings. Hernando had an appearance that was clean. Hernando denies suicidal ideation. Hernando denies suicide plan. Hernando denies homicidal ideation. Hernando endorses hallucinations.    Hernando's  height is 5' (1.524 m) and weight is 45.7 kg (100 lb 12 oz). Her temperature is 97.7 °F (36.5 °C). Her blood pressure is 108/73 and her pulse is 96. Her respiration is 18 and oxygen saturation is 96%.       Intervention:    Encourage Hernando to perform self-hygiene, grooming, and changing of clothing. Monitor Hernando's behavior and program compliance. Monitor Hernando for suicidal ideation, homicidal ideation, sleep disturbance, and hallucinations. Encourage Hernando to eat all portions of meals and assess for meal preferences. Monitor Hernando for intake and output to ensure hydration. Notify the Physician/Physician Assistant/Advance Practice Registered Nurse (MD/PA/APRN) for any medication refusal and any change in patient condition.      Response:    Hernando verbalizes understand of unit process and procedures.      Plan:     Continue to monitor per MD/PA/APRN orders; maintain patient safety.

## 2023-10-29 NOTE — NURSING
Patient remains confused, with noted anxiety but easily redirected. Patient remains coining words and is preoccupied of calling staff Mama. Still on one on one for safety. Able to take medications without difficulty. Ambulatory with steady gait.

## 2023-10-30 PROCEDURE — 25000003 PHARM REV CODE 250

## 2023-10-30 PROCEDURE — 25000003 PHARM REV CODE 250: Performed by: FAMILY MEDICINE

## 2023-10-30 PROCEDURE — 12400001 HC PSYCH SEMI-PRIVATE ROOM

## 2023-10-30 PROCEDURE — 63600175 PHARM REV CODE 636 W HCPCS: Mod: JZ,JG | Performed by: PSYCHIATRY & NEUROLOGY

## 2023-10-30 PROCEDURE — 25000003 PHARM REV CODE 250: Performed by: PSYCHIATRY & NEUROLOGY

## 2023-10-30 PROCEDURE — 25000003 PHARM REV CODE 250: Performed by: NURSE PRACTITIONER

## 2023-10-30 RX ORDER — LITHIUM CARBONATE 150 MG/1
150 CAPSULE ORAL 2 TIMES DAILY
Status: DISCONTINUED | OUTPATIENT
Start: 2023-10-30 | End: 2023-10-30

## 2023-10-30 RX ORDER — LITHIUM CARBONATE 300 MG/1
300 CAPSULE ORAL 2 TIMES DAILY
Status: DISCONTINUED | OUTPATIENT
Start: 2023-10-30 | End: 2023-11-01

## 2023-10-30 RX ORDER — ESCITALOPRAM OXALATE 10 MG/1
10 TABLET ORAL DAILY
Status: DISCONTINUED | OUTPATIENT
Start: 2023-10-31 | End: 2023-11-01

## 2023-10-30 RX ORDER — PALIPERIDONE 3 MG/1
9 TABLET, EXTENDED RELEASE ORAL DAILY
Status: DISCONTINUED | OUTPATIENT
Start: 2023-10-31 | End: 2023-10-30

## 2023-10-30 RX ADMIN — PALIPERIDONE 6 MG: 3 TABLET, EXTENDED RELEASE ORAL at 09:10

## 2023-10-30 RX ADMIN — LAMOTRIGINE 100 MG: 100 TABLET ORAL at 09:10

## 2023-10-30 RX ADMIN — HALOPERIDOL 10 MG: 5 TABLET ORAL at 09:10

## 2023-10-30 RX ADMIN — HYDROXYZINE HYDROCHLORIDE 50 MG: 50 TABLET, FILM COATED ORAL at 08:10

## 2023-10-30 RX ADMIN — METOPROLOL SUCCINATE 25 MG: 25 TABLET, EXTENDED RELEASE ORAL at 09:10

## 2023-10-30 RX ADMIN — DOXEPIN HYDROCHLORIDE 10 MG: 10 CAPSULE ORAL at 08:10

## 2023-10-30 RX ADMIN — LORAZEPAM 2 MG: 1 TABLET ORAL at 09:10

## 2023-10-30 RX ADMIN — TRAZODONE HYDROCHLORIDE 100 MG: 100 TABLET ORAL at 08:10

## 2023-10-30 RX ADMIN — PALIPERIDONE PALMITATE 234 MG: 234 INJECTION INTRAMUSCULAR at 11:10

## 2023-10-30 RX ADMIN — ESCITALOPRAM OXALATE 20 MG: 10 TABLET ORAL at 09:10

## 2023-10-30 RX ADMIN — LITHIUM CARBONATE 300 MG: 300 CAPSULE, GELATIN COATED ORAL at 08:10

## 2023-10-30 RX ADMIN — LITHIUM CARBONATE 150 MG: 150 CAPSULE, GELATIN COATED ORAL at 10:10

## 2023-10-30 RX ADMIN — DIPHENHYDRAMINE HYDROCHLORIDE 50 MG: 50 CAPSULE ORAL at 09:10

## 2023-10-30 NOTE — NURSING
Pt with manic and labile behavior, disruptive in  The group room, laughing out loud, then will  Start crying, poor boundaries with peers, pt  Was given Haldol 10 mg PO c Ativan 2 mg PO c Benadryl 50 mg PO prn severe anxiety/agitation, will monitor for effectiveness.

## 2023-10-30 NOTE — NURSING
PRN Medication Follow-up Note:    Behavior:    Patient (Hernando Alvarado is a 17 y.o. female, : 2005, MRN: [id number])     Allergies: Patient has no known allergies.    Hernando's  height is 5' (1.524 m) and weight is 45.7 kg (100 lb 12 oz). Her temperature is 98.2 °F (36.8 °C). Her blood pressure is 97/66 and her pulse is 85. Her respiration is 19 and oxygen saturation is 98%.     Administered trazodone 100 mg po per physician order to Hernando       Intervention:    Intervention to Hernando's response: Pt tolerated well.       Response:    Hernando's response: Effective      Plan:     Continue to monitor per MD/PA/APRN orders; and reevaluate medication effectiveness within 30 minutes.

## 2023-10-30 NOTE — NURSING
Daily Nursing Note:      Behavior:    Patient (Hernando Alvarado is a 17 y.o. female, : 2005, MRN: [id number]) demonstrating an affect that was anxious and expansive. Hernando demonstrating mood that is anxious and swings. Hernando had an appearance that was clean. Hernando denies suicidal ideation. Hernando denies suicide plan. Hernando denies homicidal ideation. Hernando denies hallucinations.    Hernando's  height is 5' (1.524 m) and weight is 45.7 kg (100 lb 12 oz). Her temperature is 98.2 °F (36.8 °C). Her blood pressure is 97/66 and her pulse is 85. Her respiration is 19 and oxygen saturation is 98%.       Intervention:    Encourage Hernando to perform self-hygiene, grooming, and changing of clothing. Monitor Hernando's behavior and program compliance. Monitor Hernando for suicidal ideation, homicidal ideation, sleep disturbance, and hallucinations. Encourage Hernando to eat all portions of meals and assess for meal preferences. Monitor Hernando for intake and output to ensure hydration. Notify the Physician/Physician Assistant/Advance Practice Registered Nurse (MD/PA/APRN) for any medication refusal and any change in patient condition.      Response:    Hernando verbalizes understand of unit process and procedures.      Plan:     Continue to monitor per MD/PA/APRN orders; maintain patient safety.

## 2023-10-30 NOTE — NURSING
PRN Medication Follow-up Note:    Behavior:    Patient (Hernando Alvarado is a 17 y.o. female, : 2005, MRN: [id number])     Allergies: Patient has no known allergies.    Hernando's  height is 5' (1.524 m) and weight is 45.7 kg (100 lb 12 oz). Her temperature is 98.2 °F (36.8 °C). Her blood pressure is 143/84 (abnormal) and her pulse is 85. Her respiration is 19 and oxygen saturation is 98%.     Administered Ativan 2 mg., Haldol 10 mg., and Benadryl 50 mg.,______ per physician order to Hernando       Intervention:    Intervention to Hernando's response: Administer medication as ordered. ________.       Response:    Hernando's response: Minimally decrease in anxiety and psychosis.  ________      Plan:     Continue to monitor per MD/PA/APRN orders; and reevaluate medication effectiveness within 30 minutes.

## 2023-10-30 NOTE — NURSING
PRN Administration Note:    Behavior:    Patient (Hernando Alvarado is a 17 y.o. female, : 2005, MRN: [id number])     Allergies: Patient has no known allergies.    Hernando's  height is 5' (1.524 m) and weight is 45.7 kg (100 lb 12 oz). Her temperature is 98.2 °F (36.8 °C). Her blood pressure is 97/66 and her pulse is 85. Her respiration is 19 and oxygen saturation is 98%.     Reason for PRN Administration: Insomnia    Intervention:    Administered trazodone 100 mg po per physician order to Hernando       Response:    Hernando tolerated administration well.      Plan:     Continue to monitor per MD/PA/APRN orders; and reevaluate medication effectiveness within 30 minutes.

## 2023-10-30 NOTE — NURSING
Daily Nursing Note:      Behavior:    Patient (Hernando Alvarado is a 17 y.o. female, : 2005, MRN: [id number]) demonstrating an affect that was  labile. Hernando demonstrating mood that is anxious and elated. Hernando had an appearance that was disheveled. Hernando denies suicidal ideation. Hernando denies suicide plan. Hernando denies homicidal ideation. Hernando endorses auditory and visual  hallucinations.    Hernando's  height is 5' (1.524 m) and weight is 45.7 kg (100 lb 12 oz). Her temperature is 98.2 °F (36.8 °C). Her blood pressure is 143/84 (abnormal) and her pulse is 85. Her respiration is 19 and oxygen saturation is 98%.     Hernando's last BM was noted on: __10/29/2023_____      Intervention:    Encourage Hernando to perform self-hygiene, grooming, and changing of clothing. Monitor Hernando's behavior and program compliance. Monitor Hernando for suicidal ideation, homicidal ideation, sleep disturbance, and hallucinations. Encourage Hernando to eat all portions of meals and assess for meal preferences. Monitor Hernando for intake and output to ensure hydration. Notify the Physician/Physician Assistant/Advance Practice Registered Nurse (MD/PA/APRN) for any medication refusal and any change in patient condition.      Response:    Hernando verbalizes understand of unit process and procedures. Hernando reported medications Effectiveness with decreasing anxiety, depression and psychosis. ______.      Plan:     Continue to monitor per MD/PA/APRN orders; maintain patient safety.

## 2023-10-30 NOTE — PROGRESS NOTES
"10/30/2023  Hernando Alvarado   2005   [id number]        Psychiatry Progress Note     Chief Complaint: "Daddy, daddy"    SUBJECTIVE:   Hernando Alvarado is a 17 y.o. female  placed under a PEC at [facility] due to delusions that she is pregnant and that she was producing milk.    Remains emotionally labile.  Has gone from crying to elevated behavior.  Still emotionally reactive.  Required PRN medication several times over the weekend.  Will titrate Invega and will taper Lexapro.  Will start on lithium (would prefer to stay away from [facility] given that she is a young female).  Will make these adjustments and will monitor progress.         UDS: (-)  Blood alcohol: <10      Current Medications:   Scheduled Meds:    doxepin  10 mg Oral QHS    EScitalopram oxalate  20 mg Oral Daily    lamoTRIgine  100 mg Oral Daily    metoprolol succinate  25 mg Oral Daily    paliperidone  6 mg Oral Daily      PRN Meds: acetaminophen, aluminum-magnesium hydroxide-simethicone, haloperidoL **AND** diphenhydrAMINE **AND** LORazepam **AND** [DISCONTINUED] haloperidol lactate **AND** diphenhydrAMINE **AND** lorazepam, hydrOXYzine HCL, traZODone, ziprasidone   Psychotherapeutics (From admission, onward)      Start     Stop Route Frequency Ordered    10/25/23 1145  paliperidone 24 hr tablet 6 mg         -- Oral Daily 10/25/23 1037    10/24/23 2100  doxepin capsule 10 mg         -- Oral Nightly 10/24/23 1013    10/22/23 1200  EScitalopram oxalate tablet 20 mg         -- Oral Daily 10/22/23 1046    10/21/23 2338  ziprasidone injection 20 mg         -- IM Every 4 hours PRN 10/21/23 2240    10/20/23 1529  haloperidoL tablet 10 mg  (Med - Acute  Behavioral Management)        See Hyperspace for full Linked Orders Report.    -- Oral Every 4 hours PRN 10/20/23 1529    10/20/23 1529  LORazepam tablet 2 mg  (Med - Acute  Behavioral Management)        See Hyperspace for full Linked Orders Report.    -- Oral Every 4 hours PRN 10/20/23 1529    10/20/23 " 1529  LORazepam injection 2 mg  (Med - Acute  Behavioral Management)        See Afia for full Linked Orders Report.    -- IM Every 4 hours PRN 10/20/23 1529    10/20/23 1529  traZODone tablet 100 mg         -- Oral Nightly PRN 10/20/23 1529            Allergies:   Review of patient's allergies indicates:  No Known Allergies     OBJECTIVE:   Vitals   Vitals:    10/30/23 0904   BP: (!) 143/84   Pulse:    Resp:    Temp:         Labs/Imaging/Studies:   No results found for this or any previous visit (from the past 36 hour(s)).       Medical Review Of Systems:  Constitutional: negative  Respiratory: negative  Cardiovascular: negative  Gastrointestinal: negative  Genitourinary:negative  Musculoskeletal:negative  Neurological: negative       Psychiatric Mental Status Exam:  General Appearance: appears stated age, well-developed, well-nourished  Arousal: alert  Behavior: labile  Movements and Motor Activity: no abnormal involuntary movements noted  Orientation: oriented to person, place, time, and situation  Speech: normal rate, normal rhythm, normal volume, normal tone  Mood: Dysphoric  Affect: emotionally reactive  Thought Process: tangential, disorganzied  Associations: loose  Thought Content and Perceptions: (+)hallucinations, (+)Delusions, no suicidal ideation, no homicidal ideation  Recent and Remote Memory: recent memory intact, remote memory intact; per interview/observation with patient  Attention and Concentration: limited, attentive to conversation; per interview/observation with patient  Fund of Knowledge: limited, aware of current events, vocabulary appropriate; based on history, vocabulary, fund of knowledge, syntax, grammar, and content  Insight: limited; based on understanding of severity of illness and HPI  Judgment: limited; based on patient's behavior and HPI      ASSESSMENT/PLAN:   Problems Addressed/Diagnoses:  Schizoaffective disorder, bipolar type (F25.0)  Generalized Anxiety Disorder  (F41.1)  Intellectual Disability (F79)      Past Medical History:   Diagnosis Date    ADHD     Depression     History of psychiatric hospitalization     Hypertension     Mitral valve regurgitation         Plan:  Bipolar disorder, acute  -Increase Invega to 9mg daily  -Start Invega Sustenna tomorrow  -Lithium 150mg BID  -Continue Lamictal     Anxiety, acute  -Decrease Lexapro to 10mg daily     Intellectual disability  -Group/Individual psychotherapy       Expected Disposition Plan: Mother's house        Kody Gleason M.D.

## 2023-10-30 NOTE — NURSING
PRN Medication Follow-up Note:    Behavior:    Patient (Hernando Alvarado is a 17 y.o. female, : 2005, MRN: [id number])     Allergies: Patient has no known allergies.    Hernando's  height is 5' (1.524 m) and weight is 45.7 kg (100 lb 12 oz). Her temperature is 98.2 °F (36.8 °C). Her blood pressure is 97/66 and her pulse is 85. Her respiration is 19 and oxygen saturation is 98%.     Administered tylenol 650 mg po per physician order to Hernando       Intervention:    Intervention to Hernando's response: pt tolerated well_.       Response:    Hernando's response: efffective      Plan:     Continue to monitor per MD/PA/APRN orders; and reevaluate medication effectiveness within 30 minutes.

## 2023-10-30 NOTE — NURSING
PRN Administration Note:    Behavior:    Patient (Hernando Alvarado is a 17 y.o. female, : 2005, MRN: [id number])     Allergies: Patient has no known allergies.    Hernando's  height is 5' (1.524 m) and weight is 45.7 kg (100 lb 12 oz). Her temperature is 98.2 °F (36.8 °C). Her blood pressure is 97/66 and her pulse is 85. Her respiration is 19 and oxygen saturation is 98%.     Reason for PRN Administration: Headache.    Intervention:    Administered tylenol 650 mg po per physician order to Hernando       Response:    Hernando tolerated administration well.      Plan:     Continue to monitor per MD/PA/APRN orders; and reevaluate medication effectiveness within 30 minutes.

## 2023-10-31 LAB
BASOPHILS # BLD AUTO: 0.03 X10(3)/MCL
BASOPHILS NFR BLD AUTO: 0.5 %
CHOLEST SERPL-MCNC: 131 MG/DL
CHOLEST/HDLC SERPL: 3 {RATIO} (ref 0–5)
EOSINOPHIL # BLD AUTO: 0.14 X10(3)/MCL (ref 0–0.9)
EOSINOPHIL NFR BLD AUTO: 2.3 %
ERYTHROCYTE [DISTWIDTH] IN BLOOD BY AUTOMATED COUNT: 12.9 % (ref 11.5–17)
EST. AVERAGE GLUCOSE BLD GHB EST-MCNC: 85.3 MG/DL
GLUCOSE P FAST SERPL-MCNC: 81 MG/DL (ref 70–100)
HBA1C MFR BLD: 4.6 %
HCT VFR BLD AUTO: 39.3 % (ref 37–47)
HDLC SERPL-MCNC: 47 MG/DL (ref 35–60)
HGB BLD-MCNC: 12.5 G/DL (ref 12–16)
IMM GRANULOCYTES # BLD AUTO: 0.02 X10(3)/MCL (ref 0–0.04)
IMM GRANULOCYTES NFR BLD AUTO: 0.3 %
LDLC SERPL CALC-MCNC: 76 MG/DL (ref 50–140)
LITHIUM SERPL-MCNC: 0.25 MMOL/L (ref 0.5–1.2)
LYMPHOCYTES # BLD AUTO: 2.42 X10(3)/MCL (ref 0.6–4.6)
LYMPHOCYTES NFR BLD AUTO: 40.3 %
MCH RBC QN AUTO: 29.5 PG (ref 27–31)
MCHC RBC AUTO-ENTMCNC: 31.8 G/DL (ref 33–36)
MCV RBC AUTO: 92.7 FL (ref 80–94)
MONOCYTES # BLD AUTO: 0.4 X10(3)/MCL (ref 0.1–1.3)
MONOCYTES NFR BLD AUTO: 6.7 %
NEUTROPHILS # BLD AUTO: 2.99 X10(3)/MCL (ref 2.1–9.2)
NEUTROPHILS NFR BLD AUTO: 49.9 %
NRBC BLD AUTO-RTO: 0 %
PLATELET # BLD AUTO: 200 X10(3)/MCL (ref 130–400)
PMV BLD AUTO: 10.6 FL (ref 7.4–10.4)
RBC # BLD AUTO: 4.24 X10(6)/MCL (ref 4.2–5.4)
T PALLIDUM AB SER QL: NONREACTIVE
TRIGL SERPL-MCNC: 39 MG/DL (ref 37–140)
VLDLC SERPL CALC-MCNC: 8 MG/DL
WBC # SPEC AUTO: 6 X10(3)/MCL (ref 4.5–11.5)

## 2023-10-31 PROCEDURE — 12400001 HC PSYCH SEMI-PRIVATE ROOM

## 2023-10-31 PROCEDURE — 25000003 PHARM REV CODE 250: Performed by: PSYCHIATRY & NEUROLOGY

## 2023-10-31 PROCEDURE — 82947 ASSAY GLUCOSE BLOOD QUANT: CPT | Performed by: NURSE PRACTITIONER

## 2023-10-31 PROCEDURE — 83036 HEMOGLOBIN GLYCOSYLATED A1C: CPT | Performed by: NURSE PRACTITIONER

## 2023-10-31 PROCEDURE — 25000003 PHARM REV CODE 250

## 2023-10-31 PROCEDURE — 80061 LIPID PANEL: CPT | Performed by: NURSE PRACTITIONER

## 2023-10-31 PROCEDURE — 80178 ASSAY OF LITHIUM: CPT | Performed by: PSYCHIATRY & NEUROLOGY

## 2023-10-31 PROCEDURE — 25000003 PHARM REV CODE 250: Performed by: NURSE PRACTITIONER

## 2023-10-31 PROCEDURE — 86780 TREPONEMA PALLIDUM: CPT | Performed by: NURSE PRACTITIONER

## 2023-10-31 PROCEDURE — 85025 COMPLETE CBC W/AUTO DIFF WBC: CPT | Performed by: NURSE PRACTITIONER

## 2023-10-31 RX ADMIN — DOXEPIN HYDROCHLORIDE 10 MG: 10 CAPSULE ORAL at 08:10

## 2023-10-31 RX ADMIN — ESCITALOPRAM OXALATE 10 MG: 10 TABLET ORAL at 08:10

## 2023-10-31 RX ADMIN — DIPHENHYDRAMINE HYDROCHLORIDE 50 MG: 50 CAPSULE ORAL at 08:10

## 2023-10-31 RX ADMIN — LAMOTRIGINE 100 MG: 100 TABLET ORAL at 08:10

## 2023-10-31 RX ADMIN — LORAZEPAM 2 MG: 1 TABLET ORAL at 08:10

## 2023-10-31 RX ADMIN — HYDROXYZINE HYDROCHLORIDE 50 MG: 50 TABLET, FILM COATED ORAL at 08:10

## 2023-10-31 RX ADMIN — HALOPERIDOL 10 MG: 5 TABLET ORAL at 08:10

## 2023-10-31 RX ADMIN — LITHIUM CARBONATE 300 MG: 300 CAPSULE, GELATIN COATED ORAL at 08:10

## 2023-10-31 NOTE — GROUP NOTE
Group Psychotherapy       Group Focus: Promoting Healthy Lifestyles   Group topic: Discharge Planning. Therapist explored patients need for identifying personal strengths and qualities in working towards mental health goals. Patients explored needs within behavioral health to help increase awareness while helping decrease potential for increased inpatient stays.     Number of patients in attendance: 11    Group Start Time: 1045  Group End Time:  1130  Groups Date: 10/30/2023  Group Topic:  Behavioral Health  Group Department: [facility] - Behavioral Health Unit  Group Facilitators:  Leanne Reynoso MSW  _____________________________________________________________________    Patient Name: Hernando Alvarado  MRN: [id number]  Patient Class: IP- Psych   Admission Date\Time: 10/20/2023  3:27 PM  Hospital Length of Stay: 11  Primary Care Provider: Carmen, Primary Doctor     Referred by: Acute Psychiatry Unit Treatment Team     Target symptoms: Psychosis, Confusion      Patient's response to treatment: Active Listening; Pt was actively responding to internal stimuli throughout group session.      Progress toward goals: Not progressing     Interval History:      Diagnosis:      Plan: Continue treatment on APU

## 2023-10-31 NOTE — NURSING
PRN Medication Follow-up Note:    Behavior:    Patient (Hernando Alvarado is a 17 y.o. female, : 2005, MRN: [id number])     Allergies: Patient has no known allergies.    Hernando's  height is 5' (1.524 m) and weight is 45.7 kg (100 lb 12 oz). Her temperature is 98.2 °F (36.8 °C). Her blood pressure is 143/84 (abnormal) and her pulse is 118 (abnormal). Her respiration is 20 and oxygen saturation is 98%.     Administered trazodone 100 mg po per physician order to Hernando       Intervention:    Intervention to Hernando's response: Pt tolerated well.       Response:    Hernando's response: Effective      Plan:     Continue to monitor per MD/PA/APRN orders; and reevaluate medication effectiveness within 30 minutes.

## 2023-10-31 NOTE — GROUP NOTE
Group Psychotherapy       Group Focus: Spirituality and Well-Being   Group topic: ?Strengths/ Positive Thinking. ?Therapist explored patients need for identifying personal strengths and qualities in working towards mental health goals.?    Number of patients in attendance: 11    Group Start Time: 1045  Group End Time:  1130  Groups Date: 10/31/2023  Group Topic:  Behavioral Health  Group Department: [facility] Behavioral Health Unit  Group Facilitators:  Leanne Reynoso MSW  _____________________________________________________________________    Patient Name: Hernando Alvarado  MRN: [id number]  Patient Class: IP- Psych   Admission Date\Time: 10/20/2023  3:27 PM  Hospital Length of Stay: 11  Primary Care Provider: Carmen, Primary Doctor     Referred by: Acute Psychiatry Unit Treatment Team     Target symptoms: Psychosis     Patient's response to treatment: Not Participating; Pt was not present in group session. Alternate was unable to be provided due to pt receiving meds and being asleep.      Progress toward goals: Not progressing     Interval History:      Diagnosis:      Plan: Continue treatment on APU

## 2023-10-31 NOTE — NURSING
PRN Medication Follow-up Note:    Behavior:    Patient (Hernanod Alvarado is a 17 y.o. female, : 2005, MRN: [id number])     Allergies: Patient has no known allergies.    Hernando's  height is 5' (1.524 m) and weight is 45.7 kg (100 lb 12 oz). Her temperature is 98.2 °F (36.8 °C). Her blood pressure is 143/84 (abnormal) and her pulse is 118 (abnormal). Her respiration is 20 and oxygen saturation is 98%.     Administered hydroxyzine 50 mg po per physician order to Hernando       Intervention:    Intervention to Hernando's response: pt tolerated well  Response:    Hernando's response: effective      Plan:     Continue to monitor per MD/PA/APRN orders; and reevaluate medication effectiveness within 30 minutes.

## 2023-10-31 NOTE — NURSING
Daily Nursing Note:      Behavior:    Patient (Hernando Alvarado is a 17 y.o. female, : 2005, MRN: [id number]) demonstrating an affect that was anxious and expansive. Hernando demonstrating mood that is anxious and swings. Hernando had an appearance that was clean. Hernando denies suicidal ideation. Hernando denies suicide plan. Hernando denies homicidal ideation. Hernando denies hallucinations.    Hernando's  height is 5' (1.524 m) and weight is 45.7 kg (100 lb 12 oz). Her temperature is 98.2 °F (36.8 °C). Her blood pressure is 143/84 (abnormal) and her pulse is 118 (abnormal). Her respiration is 20 and oxygen saturation is 98%.       Intervention:    Encourage Hernando to perform self-hygiene, grooming, and changing of clothing. Monitor Hernando's behavior and program compliance. Monitor Hernando for suicidal ideation, homicidal ideation, sleep disturbance, and hallucinations. Encourage Hernando to eat all portions of meals and assess for meal preferences. Monitor Hernando for intake and output to ensure hydration. Notify the Physician/Physician Assistant/Advance Practice Registered Nurse (MD/PA/APRN) for any medication refusal and any change in patient condition.      Response:    Hernadno verbalizes understand of unit process and procedures.      Plan:     Continue to monitor per MD/PA/APRN orders; maintain patient safety.

## 2023-11-01 PROCEDURE — 25000003 PHARM REV CODE 250

## 2023-11-01 PROCEDURE — 12400001 HC PSYCH SEMI-PRIVATE ROOM

## 2023-11-01 PROCEDURE — 25000003 PHARM REV CODE 250: Performed by: NURSE PRACTITIONER

## 2023-11-01 PROCEDURE — 25000003 PHARM REV CODE 250: Performed by: FAMILY MEDICINE

## 2023-11-01 PROCEDURE — 25000003 PHARM REV CODE 250: Performed by: PSYCHIATRY & NEUROLOGY

## 2023-11-01 RX ADMIN — HALOPERIDOL 10 MG: 5 TABLET ORAL at 05:11

## 2023-11-01 RX ADMIN — LITHIUM CARBONATE 300 MG: 300 CAPSULE, GELATIN COATED ORAL at 09:11

## 2023-11-01 RX ADMIN — DOXEPIN HYDROCHLORIDE 10 MG: 10 CAPSULE ORAL at 08:11

## 2023-11-01 RX ADMIN — ESCITALOPRAM OXALATE 10 MG: 10 TABLET ORAL at 09:11

## 2023-11-01 RX ADMIN — DIPHENHYDRAMINE HYDROCHLORIDE 50 MG: 50 CAPSULE ORAL at 05:11

## 2023-11-01 RX ADMIN — METOPROLOL SUCCINATE 25 MG: 25 TABLET, EXTENDED RELEASE ORAL at 09:11

## 2023-11-01 RX ADMIN — LAMOTRIGINE 100 MG: 100 TABLET ORAL at 09:11

## 2023-11-01 RX ADMIN — LORAZEPAM 2 MG: 1 TABLET ORAL at 05:11

## 2023-11-01 RX ADMIN — LITHIUM CARBONATE 450 MG: 300 CAPSULE, GELATIN COATED ORAL at 08:11

## 2023-11-01 RX ADMIN — HYDROXYZINE HYDROCHLORIDE 50 MG: 50 TABLET, FILM COATED ORAL at 08:11

## 2023-11-01 RX ADMIN — ACETAMINOPHEN 650 MG: 325 TABLET, FILM COATED ORAL at 11:11

## 2023-11-01 NOTE — NURSING
Daily Nursing Note:      Behavior:    Patient (Hernando Alvarado is a 17 y.o. female, : 2005, MRN: [id number]) demonstrating an affect that was expansive and  labile. Hernando demonstrating mood that is anxious and swings. Hernando had an appearance that was clean. Hernando denies suicidal ideation. Hernando denies suicide plan. Hernando denies homicidal ideation. Hernando endorses auditory and visual hallucinations.    Hernando's  height is 5' (1.524 m) and weight is 45.7 kg (100 lb 12 oz). Her temperature is 98.2 °F (36.8 °C). Her blood pressure is 143/84 (abnormal) and her pulse is 118 (abnormal). Her respiration is 20 and oxygen saturation is 98%.         Intervention:    Encourage Hernando to perform self-hygiene, grooming, and changing of clothing. Monitor Hernando's behavior and program compliance. Monitor Hernadno for suicidal ideation, homicidal ideation, sleep disturbance, and hallucinations. Encourage Hernando to eat all portions of meals and assess for meal preferences. Monitor Hernando for intake and output to ensure hydration. Notify the Physician/Physician Assistant/Advance Practice Registered Nurse (MD/PA/APRN) for any medication refusal and any change in patient condition.      Response:    Hernando verbalizes understand of unit process and procedures. Hernando reported medications are effective in decreasing anxiety.      Plan:     Continue to monitor per MD/PA/APRN orders; maintain patient safety.

## 2023-11-01 NOTE — NURSING
PRN Administration Note:    Behavior:    Patient (Hernando Alvarado is a 17 y.o. female, : 2005, MRN: [id number])     Allergies: Patient has no known allergies.    Hernando's  height is 5' (1.524 m) and weight is 45.7 kg (100 lb 12 oz). Her temperature is 98.1 °F (36.7 °C). Her blood pressure is 108/71 and her pulse is 98. Her respiration is 19 and oxygen saturation is 97%.     Reason for PRN Administration: Non-redirectable behavior.  Actively hallucinating and taking re-direction from several staff members    Intervention:    Administered Ativan 2 mg, Haldol 10 mg and Benadryl 50 mg orally per physician order to Hernando       Response:    Hernando tolerated administration well.      Plan:     Continue to monitor per MD/PA/APRN orders; and reevaluate medication effectiveness within 30 minutes.

## 2023-11-01 NOTE — GROUP NOTE
"Group Psychotherapy       Group Focus: Life Skills      Number of patients in attendance: 12    Reviewed Biosocial Theory "Why do I have so much trouble controlling my emotions and my actions?" DBT general handouts. Reviewed emotional vulnerability, impulsivity, invalidating social environments, and ineffective social environments.     All participants were given a copy of the handout, patients not in attendance were provided the handouts as well.     Group Start Time: 1045  Group End Time:  1130  Groups Date: 11/1/2023  Group Topic:  Behavioral Health  Group Department: [facility]  Group Facilitators:  Juana Guo SSW   _____________________________________________________________________    Patient Name: Hernando Alvarado  MRN: [id number]  Patient Class: IP- Psych   Admission Date\Time: 10/20/2023  3:27 PM  Hospital Length of Stay: 12  Primary Care Provider: Carmen, Primary Doctor     Referred by: Acute Psychiatry Unit Treatment Team     Target symptoms: Psychosis     Patient's response to treatment: Disruptive     Progress toward goals: Minimal progress     Interval History:      Diagnosis:      Plan: Continue treatment on [facility]    "

## 2023-11-01 NOTE — GROUP NOTE
Group Psychotherapy       Group Focus: Medication      Number of patients in attendance: 17    Group Start Time: 2030  Group End Time:  2100  Groups Date: 10/31/2023  Group Topic:  Behavioral Health  Group Department: [facility]  Group Facilitators:  Shea Coats RN  _____________________________________________________________________    Patient Name: Hernando Alvarado  MRN: [id number]  Patient Class: IP- Psych   Admission Date\Time: 10/20/2023  3:27 PM  Hospital Length of Stay: 12  Primary Care Provider: Carmen, Primary Doctor     Referred by: Acute Psychiatry Unit Treatment Team     Target symptoms: Psychosis     Patient's response to treatment: Not Participating     Progress toward goals: Progressing slowly     Interval History:      Diagnosis: Psychosis     Plan: Continue treatment on [facility]

## 2023-11-01 NOTE — NURSING
PRN Medication Follow-up Note:    Behavior:    Patient (Hernando Alvarado is a 17 y.o. female, : 2005, MRN: [id number])     Allergies: Patient has no known allergies.    Hernando's  height is 5' (1.524 m) and weight is 45.7 kg (100 lb 12 oz). Her temperature is 98.2 °F (36.8 °C). Her blood pressure is 143/84 (abnormal) and her pulse is 118 (abnormal). Her respiration is 20 and oxygen saturation is 98%.     Administered Hydroxyzine 50mg per physician order to Hernando for anxiety.      Intervention:    Intervention to Hernando's response: Medication tolerated well.       Response:    Hernando's response: No further complaints of anxiety.      Plan:     Continue to monitor per MD/PA/APRN orders; and reevaluate medication effectiveness within 30 minutes.

## 2023-11-01 NOTE — NURSING
PRN Medication Follow-up Note:    Behavior:    Patient (Hernando Alvarado is a 17 y.o. female, : 2005, MRN: [id number])     Allergies: Patient has no known allergies.    Hernando's  height is 5' (1.524 m) and weight is 45.7 kg (100 lb 12 oz). Her temperature is 98.1 °F (36.7 °C). Her blood pressure is 108/71 and her pulse is 98. Her respiration is 19 and oxygen saturation is 97%.     Administered Ativan 2mg, Haldol 10mg, and Benadryl orally  per physician order to Hernando       Intervention:    Intervention to Hernando's response: Patient is quiet and resting.       Response:    Hernando's response: medication effective      Plan:     Continue to monitor per MD/PA/APRN orders; and reevaluate medication effectiveness within 30 minutes.

## 2023-11-01 NOTE — CARE UPDATE
Pt met with [facility] , SAL Ott for service assessment in SSW office. SSW was present and assisted with assessment. SSW contacted pt mother, January, via telephone to affirm that services are appropriate.

## 2023-11-01 NOTE — PLAN OF CARE
Problem: Violence Risk or Actual  Goal: Anger and Impulse Control  Outcome: Ongoing, Progressing     Problem: Cognitive Impairment (Psychotic Signs/Symptoms)  Goal: Optimal Cognitive Function (Psychotic Signs/Symptoms)  Outcome: Ongoing, Progressing     Problem: Psychomotor Impairment (Psychotic Signs/Symptoms)  Goal: Improved Psychomotor Symptoms (Psychotic Signs/Symptoms)  Outcome: Ongoing, Progressing     Problem: Fall Injury Risk  Goal: Absence of Fall and Fall-Related Injury  Outcome: Ongoing, Progressing      I had started her on Ditropan in December but she could not tolerate it due to \"jitters\". She can manage without medication.

## 2023-11-01 NOTE — PLAN OF CARE
Hernando attends 50% of TR groups, is 1:1, disruptive but is cooperative and redirectable during groups, is impulsive with attention seeking behaviors along with active psychosis, and attending her ADL's with prompting.    Hernando attended treatment team, was cooperative but bizarre, and slowly progressing towards treatment goals.

## 2023-11-01 NOTE — PROGRESS NOTES
"11/1/2023  Hernando Alvarado   2005   [id number]        Psychiatry Progress Note     Chief Complaint: "Daddy, daddy"    SUBJECTIVE:   Hernando Alvarado is a 17 y.o. female  placed under a PEC at [facility] due to delusions that she is pregnant and that she was producing milk.    Restless.  There has been mild improvement since the recent medication change.  Will schedule second Invega Sustenna injection on 11/3/23.  Will also discontinue Lexapro and will titrate Lithium.  Will plan for lithium level on 11/3 AM.  Will make these adjustments and will monitor progress.       UDS: (-)  Blood alcohol: <10      Current Medications:   Scheduled Meds:    doxepin  10 mg Oral QHS    EScitalopram oxalate  10 mg Oral Daily    lamoTRIgine  100 mg Oral Daily    lithium carbonate  300 mg Oral BID    metoprolol succinate  25 mg Oral Daily      PRN Meds: acetaminophen, aluminum-magnesium hydroxide-simethicone, haloperidoL **AND** diphenhydrAMINE **AND** LORazepam **AND** [DISCONTINUED] haloperidol lactate **AND** diphenhydrAMINE **AND** lorazepam, hydrOXYzine HCL, traZODone, ziprasidone   Psychotherapeutics (From admission, onward)      Start     Stop Route Frequency Ordered    10/31/23 0900  EScitalopram oxalate tablet 10 mg         -- Oral Daily 10/30/23 0948    10/30/23 2100  lithium capsule 300 mg         -- Oral 2 times daily 10/30/23 1019    10/24/23 2100  doxepin capsule 10 mg         -- Oral Nightly 10/24/23 1013    10/21/23 2338  ziprasidone injection 20 mg         -- IM Every 4 hours PRN 10/21/23 2240    10/20/23 1529  haloperidoL tablet 10 mg  (Med - Acute  Behavioral Management)        See Hyperspace for full Linked Orders Report.    -- Oral Every 4 hours PRN 10/20/23 1529    10/20/23 1529  LORazepam tablet 2 mg  (Med - Acute  Behavioral Management)        See Hyperspace for full Linked Orders Report.    -- Oral Every 4 hours PRN 10/20/23 1529    10/20/23 1529  LORazepam injection 2 mg  (Med - Acute  Behavioral " Management)        [facility].    -- IM Every 4 hours PRN 10/20/23 1529    10/20/23 1529  traZODone tablet 100 mg         -- Oral Nightly PRN 10/20/23 1529            Allergies:   Review of patient's allergies indicates:  No Known Allergies     OBJECTIVE:   Vitals   Vitals:    11/01/23 0701   BP: 108/71   Pulse: 98   Resp: 19   Temp: 98.1 °F (36.7 °C)        Labs/Imaging/Studies:   Recent Results (from the past 36 hour(s))   CBC with Differential    Collection Time: 10/31/23  7:58 AM   Result Value Ref Range    WBC 6.00 4.50 - 11.50 x10(3)/mcL    RBC 4.24 4.20 - 5.40 x10(6)/mcL    Hgb 12.5 12.0 - 16.0 g/dL    Hct 39.3 37.0 - 47.0 %    MCV 92.7 80.0 - 94.0 fL    MCH 29.5 27.0 - 31.0 pg    MCHC 31.8 (L) 33.0 - 36.0 g/dL    RDW 12.9 11.5 - 17.0 %    Platelet 200 130 - 400 x10(3)/mcL    MPV 10.6 (H) 7.4 - 10.4 fL    Neut % 49.9 %    Lymph % 40.3 %    Mono % 6.7 %    Eos % 2.3 %    Basophil % 0.5 %    Lymph # 2.42 0.6 - 4.6 x10(3)/mcL    Neut # 2.99 2.1 - 9.2 x10(3)/mcL    Mono # 0.40 0.1 - 1.3 x10(3)/mcL    Eos # 0.14 0 - 0.9 x10(3)/mcL    Baso # 0.03 <=0.2 x10(3)/mcL    IG# 0.02 0 - 0.04 x10(3)/mcL    IG% 0.3 %    NRBC% 0.0 %   Glucose, Fasting    Collection Time: 10/31/23  7:58 AM   Result Value Ref Range    Glucose Fasting 81 70 - 100 mg/dL   Lipid Panel    Collection Time: 10/31/23  7:58 AM   Result Value Ref Range    Cholesterol Total 131 <=200 mg/dL    HDL Cholesterol 47 35 - 60 mg/dL    Triglyceride 39 37 - 140 mg/dL    Cholesterol/HDL Ratio 3 0 - 5    Very Low Density Lipoprotein 8     LDL Cholesterol 76.00 50.00 - 140.00 mg/dL   SYPHILIS ANTIBODY (WITH REFLEX RPR)    Collection Time: 10/31/23  7:58 AM   Result Value Ref Range    Syphilis Antibody Nonreactive Nonreactive, Equivocal   Hemoglobin A1C    Collection Time: 10/31/23  7:58 AM   Result Value Ref Range    Hemoglobin A1c 4.6 <=7.0 %    Estimated Average Glucose 85.3 mg/dL   Lithium Level    Collection Time: 10/31/23  7:58  AM   Result Value Ref Range    Lithium Level 0.25 (L) 0.50 - 1.20 mmol/L          Medical Review Of Systems:  Constitutional: negative  Respiratory: negative  Cardiovascular: negative  Gastrointestinal: negative  Genitourinary:negative  Musculoskeletal:negative  Neurological: negative       Psychiatric Mental Status Exam:  General Appearance: appears stated age, well-developed, well-nourished  Arousal: alert  Behavior: labile  Movements and Motor Activity: no abnormal involuntary movements noted  Orientation: oriented to person, place, time, and situation  Speech: normal rate, normal rhythm, normal volume, normal tone  Mood: Reactive but mild improvement  Affect: emotionally reactive  Thought Process: tangential  Associations: loose  Thought Content and Perceptions: (+)hallucinations, (+)Delusions, no suicidal ideation, no homicidal ideation  Recent and Remote Memory: recent memory intact, remote memory intact; per interview/observation with patient  Attention and Concentration: limited, attentive to conversation; per interview/observation with patient  Fund of Knowledge: limited, aware of current events, vocabulary appropriate; based on history, vocabulary, fund of knowledge, syntax, grammar, and content  Insight: limited; based on understanding of severity of illness and HPI  Judgment: limited; based on patient's behavior and HPI      ASSESSMENT/PLAN:   Problems Addressed/Diagnoses:  Schizoaffective disorder, bipolar type (F25.0)  Generalized Anxiety Disorder (F41.1)  Intellectual Disability (F79)      Past Medical History:   Diagnosis Date    ADHD     Depression     History of psychiatric hospitalization     Hypertension     Mitral valve regurgitation         Plan:  Bipolar disorder, acute  -Invega Sustenna 156mg IM on 11/3  -Lithium 450mg BID  -Continue Lamictal     Anxiety, acute  -D/c Lexapro     Intellectual disability  -Group/Individual psychotherapy       Expected Disposition Plan: Mother's  jonh Gleason M.D.

## 2023-11-01 NOTE — CARE UPDATE
Treatment Team    Pt seem for treatment team today with interdisciplinary team.  Pt is Cooperative and Anxious with Tx team. Pt denies symptoms at this time. Pt was extremely bizarre during assessment. Pt was taking in a baby voice and needed several redirection by staff. MD changed pt meds at this time. Treatment teams goals Not met at this time. Pt DC plan is home. DC date scheduled for 11.3.2023.

## 2023-11-02 PROCEDURE — 12400001 HC PSYCH SEMI-PRIVATE ROOM

## 2023-11-02 PROCEDURE — 25000003 PHARM REV CODE 250: Performed by: NURSE PRACTITIONER

## 2023-11-02 PROCEDURE — 25000003 PHARM REV CODE 250

## 2023-11-02 PROCEDURE — 25000003 PHARM REV CODE 250: Performed by: PSYCHIATRY & NEUROLOGY

## 2023-11-02 PROCEDURE — 25000003 PHARM REV CODE 250: Performed by: FAMILY MEDICINE

## 2023-11-02 RX ORDER — METOPROLOL SUCCINATE 25 MG/1
25 TABLET, EXTENDED RELEASE ORAL DAILY
Qty: 30 TABLET | Refills: 0 | Status: SHIPPED | OUTPATIENT
Start: 2023-11-03 | End: 2023-12-03

## 2023-11-02 RX ORDER — LITHIUM CARBONATE 150 MG/1
450 CAPSULE ORAL 2 TIMES DAILY
Qty: 180 CAPSULE | Refills: 0 | Status: SHIPPED | OUTPATIENT
Start: 2023-11-02 | End: 2023-12-02

## 2023-11-02 RX ORDER — DOXEPIN HYDROCHLORIDE 10 MG/1
10 CAPSULE ORAL NIGHTLY
Qty: 30 CAPSULE | Refills: 0 | Status: SHIPPED | OUTPATIENT
Start: 2023-11-02 | End: 2023-12-02

## 2023-11-02 RX ORDER — LAMOTRIGINE 100 MG/1
100 TABLET ORAL DAILY
Qty: 30 TABLET | Refills: 0 | Status: SHIPPED | OUTPATIENT
Start: 2023-11-02 | End: 2023-12-02

## 2023-11-02 RX ADMIN — DOXEPIN HYDROCHLORIDE 10 MG: 10 CAPSULE ORAL at 08:11

## 2023-11-02 RX ADMIN — METOPROLOL SUCCINATE 25 MG: 25 TABLET, EXTENDED RELEASE ORAL at 09:11

## 2023-11-02 RX ADMIN — LITHIUM CARBONATE 450 MG: 300 CAPSULE, GELATIN COATED ORAL at 09:11

## 2023-11-02 RX ADMIN — LAMOTRIGINE 100 MG: 100 TABLET ORAL at 09:11

## 2023-11-02 RX ADMIN — ACETAMINOPHEN 650 MG: 325 TABLET, FILM COATED ORAL at 08:11

## 2023-11-02 RX ADMIN — LITHIUM CARBONATE 450 MG: 300 CAPSULE, GELATIN COATED ORAL at 08:11

## 2023-11-02 NOTE — NURSING
Daily Nursing Note:      Behavior:    Patient (Hernando Alvarado is a 17 y.o. female, : 2005, MRN: [id number]) demonstrating an affect that was expansive and  labile. Hernando demonstrating mood that is anxious, elated, and swings. Hernando had an appearance that was disheveled. Hernando denies suicidal ideation. Hernando denies suicide plan. Hernando denies homicidal ideation. Hernando endorses auditory and visual hallucinations.    Hernando's  height is 5' (1.524 m) and weight is 45.7 kg (100 lb 12 oz). Her temperature is 98.1 °F (36.7 °C). Her blood pressure is 108/71 and her pulse is 98. Her respiration is 19 and oxygen saturation is 97%.         Intervention:    Encourage Hernando to perform self-hygiene, grooming, and changing of clothing. Monitor Hernando's behavior and program compliance. Monitor Hernando for suicidal ideation, homicidal ideation, sleep disturbance, and hallucinations. Encourage Hernando to eat all portions of meals and assess for meal preferences. Monitor Hernando for intake and output to ensure hydration. Notify the Physician/Physician Assistant/Advance Practice Registered Nurse (MD/PA/APRN) for any medication refusal and any change in patient condition.      Response:    Hernando verbalizes understand of unit process and procedures.       Plan:     Continue to monitor per MD/PA/APRN orders; maintain patient safety.

## 2023-11-02 NOTE — NURSING
Patient alert  and oriented  x4 , ambulatory with steady gait, One on one still in place. Still with noted confusion but easily redirected . Denies SI and HI , denies pain, no noted anxiety. All medications accepted and tolerated well.

## 2023-11-02 NOTE — NURSING
PRN Medication Follow-up Note:    Behavior:    Patient (Hernando Alvarado is a 17 y.o. female, : 2005, MRN: [id number])     Allergies: Patient has no known allergies.    Hernando's  height is 5' (1.524 m) and weight is 45.7 kg (100 lb 12 oz). Her temperature is 98.1 °F (36.7 °C). Her blood pressure is 108/71 and her pulse is 98. Her respiration is 19 and oxygen saturation is 97%.     Administered Hydroxyzine 50mg per physician order to Hernando for anxiety.      Intervention:    Intervention to Hernando's response:Medication tolerated well.       Response:    Hernando's response: No further complaints of anxiety.      Plan:     Continue to monitor per MD/PA/APRN orders; and reevaluate medication effectiveness within 30 minutes.

## 2023-11-02 NOTE — GROUP NOTE
Group Psychotherapy       Group Focus: Medication      Number of patients in attendance: 13    Group Start Time: 2030  Group End Time:  2100  Groups Date: 11/2/2023  Group Topic:  Behavioral Health  Group Department: [facility]  Group Facilitators:  Shea Coats RN  _____________________________________________________________________    Patient Name: Hernando Alvarado  MRN: [id number]  Patient Class: IP- Psych   Admission Date\Time: 10/20/2023  3:27 PM  Hospital Length of Stay: 13  Primary Care Provider: Carmen Primary Doctor     Referred by: Acute Psychiatry Unit Treatment Team     Target symptoms: Psychosis     Patient's response to treatment: Active Listening     Progress toward goals: Progressing slowly     Interval History:      Diagnosis: Psychosis     Plan: Continue treatment on APU

## 2023-11-02 NOTE — GROUP NOTE
Group Psychotherapy       Group Focus: Communication Skills   Group topic: Communication Skills: Therapist explored patients need for increasing communication skills through assertiveness or active listening.    Number of patients in attendance: 9    Group Start Time: 1045  Group End Time:  1130  Groups Date: 11/2/2023  Group Topic:  Behavioral Health  Group Department: [facility]  Group Facilitators:  Ashley Villalba  _____________________________________________________________________    Patient Name: Hernando Alvarado  MRN: [id number]  Patient Class: IP- Psych   Admission Date\Time: 10/20/2023  3:27 PM  Hospital Length of Stay: 13  Primary Care Provider: Carmen Primary Doctor     Referred by: Acute Psychiatry Unit Treatment Team     Target symptoms: Mood Disorder     Patient's response to treatment: Active Listening     Progress toward goals: Progressing slowly     Interval History:      Diagnosis:      Plan: Continue treatment on APU

## 2023-11-02 NOTE — PROGRESS NOTES
"11/2/2023  Hernando Alvarado   2005   [id number]        Psychiatry Progress Note     Chief Complaint: "Daddy, daddy"    SUBJECTIVE:   Hernando Alvarado is a 17 y.o. female  placed under a PEC at [facility] due to delusions that she is pregnant and that she was producing milk.    Today patient was calm and cooperative. She continues to show improvement since the recent medication change.  Will schedule second Invega Sustenna injection on 11/3/23. Will plan for lithium level on 11/3 AM.  States that she is tolerating her medications without issue. Sleeping and eating well. She did receive an oral PRN for behavioral issues yesterday but she has been more calm. Staff have not reported any behavioral issues today. Will continue with current medications and plan for discharge tomorrow.       UDS: (-)  Blood alcohol: <10      Current Medications:   Scheduled Meds:    doxepin  10 mg Oral QHS    lamoTRIgine  100 mg Oral Daily    lithium carbonate  450 mg Oral BID    metoprolol succinate  25 mg Oral Daily    [START ON 11/3/2023] paliperidone palmitate  156 mg Intramuscular Once      PRN Meds: acetaminophen, aluminum-magnesium hydroxide-simethicone, haloperidoL **AND** diphenhydrAMINE **AND** LORazepam **AND** [DISCONTINUED] haloperidol lactate **AND** diphenhydrAMINE **AND** lorazepam, hydrOXYzine HCL, traZODone, ziprasidone   Psychotherapeutics (From admission, onward)      Start     Stop Route Frequency Ordered    11/03/23 0800  paliperidone palmitate injection 156 mg         -- IM Once 11/01/23 1140    11/01/23 2100  lithium carbonate capsule 450 mg         -- Oral 2 times daily 11/01/23 1140    10/24/23 2100  doxepin capsule 10 mg         -- Oral Nightly 10/24/23 1013    10/21/23 2338  ziprasidone injection 20 mg         -- IM Every 4 hours PRN 10/21/23 2240    10/20/23 1529  haloperidoL tablet 10 mg  (Med - Acute  Behavioral Management)        See Hyperspace for full Linked Orders Report.    -- Oral Every 4 hours PRN " 10/20/23 1529    10/20/23 1529  LORazepam tablet 2 mg  (Med - Acute  Behavioral Management)        See Hyperspace for full Linked Orders Report.    -- Oral Every 4 hours PRN 10/20/23 1529    10/20/23 1529  LORazepam injection 2 mg  (Med - Acute  Behavioral Management)        See Hyperspace for full Linked Orders Report.    -- IM Every 4 hours PRN 10/20/23 1529    10/20/23 1529  traZODone tablet 100 mg         -- Oral Nightly PRN 10/20/23 1529            Allergies:   Review of patient's allergies indicates:  No Known Allergies     OBJECTIVE:   Vitals   Vitals:    11/02/23 0902   BP: 110/76   Pulse:    Resp:    Temp:         Labs/Imaging/Studies:   No results found for this or any previous visit (from the past 36 hour(s)).         Medical Review Of Systems:  Constitutional: negative  Respiratory: negative  Cardiovascular: negative  Gastrointestinal: negative  Genitourinary:negative  Musculoskeletal:negative  Neurological: negative       Psychiatric Mental Status Exam:  General Appearance: appears stated age, well-developed, well-nourished  Arousal: alert  Behavior: labile  Movements and Motor Activity: no abnormal involuntary movements noted  Orientation: oriented to person, place, time, and situation  Speech: normal rate, normal rhythm, normal volume, normal tone  Mood: Reactive but mild improvement  Affect: emotionally reactive  Thought Process: tangential  Associations: loose  Thought Content and Perceptions: (+)hallucinations, (+)Delusions, no suicidal ideation, no homicidal ideation  Recent and Remote Memory: recent memory intact, remote memory intact; per interview/observation with patient  Attention and Concentration: limited, attentive to conversation; per interview/observation with patient  Fund of Knowledge: limited, aware of current events, vocabulary appropriate; based on history, vocabulary, fund of knowledge, syntax, grammar, and content  Insight: limited; based on understanding of severity of illness  and HPI  Judgment: limited; based on patient's behavior and HPI      ASSESSMENT/PLAN:   Problems Addressed/Diagnoses:  Schizoaffective disorder, bipolar type (F25.0)  Generalized Anxiety Disorder (F41.1)  Intellectual Disability (F79)      Past Medical History:   Diagnosis Date    ADHD     Depression     History of psychiatric hospitalization     Hypertension     Mitral valve regurgitation         Plan:  Bipolar disorder, acute  -Invega Sustenna 156mg IM on 11/3  -Lithium 450mg BID  -Continue Lamictal        Intellectual disability  -Group/Individual psychotherapy       Expected Disposition Plan: Mother's house        ROB Delgado-BC

## 2023-11-02 NOTE — PROGRESS NOTES
Inpatient Nutrition Evaluation    Admit Date: 10/20/2023   Total duration of encounter: 13 days    Nutrition Recommendation/Prescription     Continue Regular diet as tolerated  Monitor po intake, wt    Nutrition Assessment     Chart Review    Reason Seen: follow-up    Malnutrition Screening Tool Results                Diagnosis: Bipolar Disorder, most recent episode depressed, severe   Generalized Anxiety Disorder  Intellectual Disability  R/o Schizoaffective disorder     Relevant Medical History: ADHD, depression, HTN, mitral valve regurgitation    Nutrition-Related Medications: No nutritional meds noted    Nutrition-Related Labs: 10/26-No recent meds noted  10/31-Gluc 81, A1c 4.6      Diet Order: Diet Adult Regular  Oral Supplement Order: none  Appetite/Oral Intake: good/% of meals  Factors Affecting Nutritional Intake: none identified  Food/Christianity/Cultural Preferences: unable to obtain  Food Allergies: no known food allergies       Wound(s):   None noted    Comments  11/2: Unable to visit pt. Pt continues with 1:1 sitter. Spoke with staff over the phone. Staff reports pt continue with good po intake of meals; 100% po intake documented. No GI complaints noted.    10/26: Pt present with 1:1 sitter. Pt unable to answer questions appropriately. Sitter reports pt wasting meals d/t large portions -- will adjust diet. Unable to obtain UBW and intake/wt hx at this time; will attempt to obtain more information on f/up if able.      Anthropometrics    Height: 5' (152.4 cm) Height Method: Measured  Last Weight: 45.7 kg (100 lb 12 oz) (10/20/23 1608) Weight Method: Standard Scale  BMI (Calculated): 19.7  BMI Classification: normal (BMI 18.5-24.9)     Ideal Body Weight (IBW), Female: 100 lb     % Ideal Body Weight, Female (lb): 100.75 %                    Usual Body Weight (UBW), kg:  (Unable to obtain)        Usual Weight Provided By: unable to obtain usual weight    Wt Readings from Last 5 Encounters:   10/20/23  45.7 kg (100 lb 12 oz) (4 %, Z= -1.75)*   10/20/23 45.2 kg (99 lb 9.6 oz) (3 %, Z= -1.85)*   10/11/23 45.2 kg (99 lb 9.6 oz) (3 %, Z= -1.85)*   10/10/23 52.2 kg (115 lb) (24 %, Z= -0.71)*   09/09/23 45.4 kg (100 lb) (3 %, Z= -1.82)*     * Growth percentiles are based on CDC (Girls, 2-20 Years) data.     Weight Change(s) Since Admission: 11/2-No new wt  Admit Weight: 45.7 kg (100 lb 12 oz) (10/20/23 1530)  10/26: Unable to obtain UBW    Patient Education    Not applicable.    Monitoring & Evaluation     Dietitian will monitor food and beverage intake and weight.  Nutrition Risk/Follow-Up: low (follow-up in 5-7 days)  Patients assigned 'low nutrition risk' status do not qualify for a full nutritional assessment but will be monitored and re-evaluated in a 5-7 day time period. Please consult if re-evaluation needed sooner.

## 2023-11-03 VITALS
TEMPERATURE: 99 F | HEART RATE: 93 BPM | BODY MASS INDEX: 19.78 KG/M2 | OXYGEN SATURATION: 97 % | WEIGHT: 100.75 LBS | SYSTOLIC BLOOD PRESSURE: 122 MMHG | DIASTOLIC BLOOD PRESSURE: 82 MMHG | HEIGHT: 60 IN | RESPIRATION RATE: 18 BRPM

## 2023-11-03 PROBLEM — F29 PSYCHOSIS: Status: RESOLVED | Noted: 2023-10-22 | Resolved: 2023-11-03

## 2023-11-03 PROBLEM — F25.0 SCHIZOAFFECTIVE DISORDER, BIPOLAR TYPE: Status: ACTIVE | Noted: 2023-11-03

## 2023-11-03 PROCEDURE — 25000003 PHARM REV CODE 250: Performed by: NURSE PRACTITIONER

## 2023-11-03 PROCEDURE — 25000003 PHARM REV CODE 250: Performed by: PSYCHIATRY & NEUROLOGY

## 2023-11-03 PROCEDURE — 63600175 PHARM REV CODE 636 W HCPCS: Mod: JZ,JG | Performed by: PSYCHIATRY & NEUROLOGY

## 2023-11-03 RX ADMIN — PALIPERIDONE PALMITATE 156 MG: 156 INJECTION INTRAMUSCULAR at 08:11

## 2023-11-03 RX ADMIN — LITHIUM CARBONATE 450 MG: 300 CAPSULE, GELATIN COATED ORAL at 09:11

## 2023-11-03 RX ADMIN — LAMOTRIGINE 100 MG: 100 TABLET ORAL at 09:11

## 2023-11-03 NOTE — GROUP NOTE
Group Psychotherapy       Group Focus: Life Skills   Therapists explored healthy and unhealthy boundaries with patients. Patients were educated on warning signs and how to identify boundaries in life.      Number of patients in attendance: 10    Group Start Time: 1045  Group End Time:  1130  Groups Date: 11/3/2023  Group Topic:  Behavioral Health  Group Department: [facility]  Group Facilitators:  Leanne Reynoso MSW  _____________________________________________________________________    Patient Name: Hernando Alvarado  MRN: [id number]  Patient Class: IP- Psych   Admission Date\Time: 10/20/2023  3:27 PM  Hospital Length of Stay: 14  Primary Care Provider: Carm[facility], Primary Doctor     Referred by: Acute Psychiatry Unit Treatment Team     Target symptoms: Psychosis     Patient's response to treatment: Active Listening     Progress toward goals: Progressing slowly     Interval History:      Diagnosis:      Plan: Continue treatment on APU

## 2023-11-03 NOTE — NURSING
Discharge instructions given to patient and mother.  Patient's mother notified of medications prescribed and of follow up appointments.
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
## 2023-11-03 NOTE — NURSING
Daily Nursing Note:      Behavior:    Patient (Hernando Alvarado is a 17 y.o. female, : 2005, MRN: [id number]) demonstrating an affect that was  labile. Hernando demonstrating mood that is swings. Hernando had an appearance that was clean. Hernando denies suicidal ideation. Hernando denies suicide plan. Hernando denies homicidal ideation. Hernando endorses auditory and visual  hallucinations.    Hernando's  height is 5' (1.524 m) and weight is 45.7 kg (100 lb 12 oz). Her temperature is 98.6 °F (37 °C). Her blood pressure is 122/82 and her pulse is 93. Her respiration is 18 and oxygen saturation is 97%.     Hernando's last BM was noted on: _10/02/2023______      Intervention:    Encourage Hernando to perform self-hygiene, grooming, and changing of clothing. Monitor Hernando's behavior and program compliance. Monitor Hernando for suicidal ideation, homicidal ideation, sleep disturbance, and hallucinations. Encourage Hernando to eat all portions of meals and assess for meal preferences. Monitor Hernando for intake and output to ensure hydration. Notify the Physician/Physician Assistant/Advance Practice Registered Nurse (MD/PA/APRN) for any medication refusal and any change in patient condition.      Response:    Hernando verbalizes understand of unit process and procedures. Hernando reported medications Effectiveness with decreasing anxiety, depression and psychosis. ______.      Plan:     Continue to monitor per MD/PA/APRN orders; maintain patient safety.

## 2023-11-03 NOTE — PLAN OF CARE
Hernando met interdisciplinary treatment goal of Increased Participation and Engagement.  CTRS Discharge Recommendations:  Encouraged Pt. to actively utilize available community resources to increase leisure involvement to decrease signs and symptoms of illness.  Encouraged Pt. to utilize coping skills on a regular basis to reduce the risk of decomposition and re-hospitalization.

## 2023-11-03 NOTE — NURSING
Discharge Note:    Hernando Alvarado is a 17 y.o. female, : 2005, MRN: [id number], admitted on 10/20/2023 for Kody Gleason MD with a diagnosis of Psychosis, unspecified psychosis type [F29].    Patient discharged on 11/3/2023 per physician orders in stable condition. Patient denied suicidal ideation, homicidal ideation,  endorses auditory and visual hallucinations. Patient was discharged with valuables, personal belongings, prescriptions, discharge instructions, and an educational handout explaining the diagnosis and prescribed medications. Patient verbalized understanding of the discharge instructions and importance of follow-up visits. Patient was escorted out of the facility by [facility] and placed into a Medicaid transportation to be transported to home.     Patient discharged on the following medications:     Medication List        START taking these medications      doxepin 10 MG capsule  Commonly known as: SINEQUAN  Take 1 capsule (10 mg total) by mouth every evening.     lithium carbonate 150 MG capsule  Take 3 capsules (450 mg total) by mouth 2 (two) times a day.     metoprolol succinate 25 MG 24 hr tablet  Commonly known as: TOPROL-XL  Take 1 tablet (25 mg total) by mouth once daily.     paliperidone palmitate 156 mg/mL Syrg injection  Commonly known as: INVEGA SUSTENNA  Inject 1 mL (156 mg total) into the muscle once. for 1 dose            CONTINUE taking these medications      lamoTRIgine 100 MG tablet  Commonly known as: LAMICTAL  Take 1 tablet (100 mg total) by mouth once daily.            STOP taking these medications      EScitalopram oxalate 20 MG tablet  Commonly known as: LEXAPRO     hydrOXYzine pamoate 25 MG Cap  Commonly known as: VISTARIL     lisinopriL 5 MG tablet  Commonly known as: PRINIVIL,ZESTRIL     medroxyPROGESTERone 150 mg/mL Syrg  Commonly known as: DEPO-PROVERA     risperiDONE 1 MG tablet  Commonly known as: RISPERDAL               Where to Get Your Medications         You can get these medications from any pharmacy    Bring a paper prescription for each of these medications  doxepin 10 MG capsule  lamoTRIgine 100 MG tablet  lithium carbonate 150 MG capsule  metoprolol succinate 25 MG 24 hr tablet  paliperidone palmitate 156 mg/mL Syrg injection

## 2024-06-15 ENCOUNTER — HOSPITAL ENCOUNTER (EMERGENCY)
Facility: HOSPITAL | Age: 19
Discharge: PSYCHIATRIC OR SUBSTANCE USE FACILITY | End: 2024-06-15
Attending: SPECIALIST
Payer: MEDICAID

## 2024-06-15 VITALS
DIASTOLIC BLOOD PRESSURE: 80 MMHG | WEIGHT: 150 LBS | OXYGEN SATURATION: 99 % | RESPIRATION RATE: 20 BRPM | TEMPERATURE: 99 F | HEIGHT: 64 IN | SYSTOLIC BLOOD PRESSURE: 121 MMHG | BODY MASS INDEX: 25.61 KG/M2 | HEART RATE: 112 BPM

## 2024-06-15 DIAGNOSIS — Z86.59 HISTORY OF BIPOLAR DISORDER: ICD-10-CM

## 2024-06-15 DIAGNOSIS — R46.89 OUTBURSTS OF EXPLOSIVE BEHAVIOR: ICD-10-CM

## 2024-06-15 DIAGNOSIS — R45.851 SUICIDAL IDEATION: Primary | ICD-10-CM

## 2024-06-15 LAB
ALBUMIN SERPL-MCNC: 3.9 G/DL (ref 3.5–5)
ALBUMIN/GLOB SERPL: 1.4 RATIO (ref 1.1–2)
ALP SERPL-CCNC: 115 UNIT/L (ref 40–150)
ALT SERPL-CCNC: 36 UNIT/L (ref 0–55)
AMPHET UR QL SCN: NEGATIVE
ANION GAP SERPL CALC-SCNC: 8 MEQ/L
APAP SERPL-MCNC: <17.4 UG/ML (ref 10–30)
AST SERPL-CCNC: 88 UNIT/L (ref 5–34)
B-HCG UR QL: NEGATIVE
BACTERIA #/AREA URNS AUTO: ABNORMAL /HPF
BARBITURATE SCN PRESENT UR: NEGATIVE
BASOPHILS # BLD AUTO: 0.03 X10(3)/MCL
BASOPHILS NFR BLD AUTO: 0.3 %
BENZODIAZ UR QL SCN: NEGATIVE
BILIRUB SERPL-MCNC: 0.6 MG/DL
BILIRUB UR QL STRIP.AUTO: NEGATIVE
BUN SERPL-MCNC: 7.6 MG/DL (ref 7–18.7)
CALCIUM SERPL-MCNC: 9.1 MG/DL (ref 8.4–10.2)
CANNABINOIDS UR QL SCN: NEGATIVE
CHLORIDE SERPL-SCNC: 106 MMOL/L (ref 98–107)
CLARITY UR: ABNORMAL
CO2 SERPL-SCNC: 23 MMOL/L (ref 22–29)
COCAINE UR QL SCN: NEGATIVE
COLOR UR AUTO: YELLOW
CREAT SERPL-MCNC: 0.82 MG/DL (ref 0.55–1.02)
CREAT/UREA NIT SERPL: 9
EOSINOPHIL # BLD AUTO: 0.04 X10(3)/MCL (ref 0–0.9)
EOSINOPHIL NFR BLD AUTO: 0.4 %
ERYTHROCYTE [DISTWIDTH] IN BLOOD BY AUTOMATED COUNT: 12.8 % (ref 11.5–17)
ETHANOL SERPL-MCNC: <10 MG/DL
FLUAV AG UPPER RESP QL IA.RAPID: NOT DETECTED
FLUBV AG UPPER RESP QL IA.RAPID: NOT DETECTED
GFR SERPLBLD CREATININE-BSD FMLA CKD-EPI: >60 ML/MIN/1.73/M2
GLOBULIN SER-MCNC: 2.8 GM/DL (ref 2.4–3.5)
GLUCOSE SERPL-MCNC: 118 MG/DL (ref 74–100)
GLUCOSE UR QL STRIP: NEGATIVE
HCT VFR BLD AUTO: 38.4 % (ref 37–47)
HGB BLD-MCNC: 12.6 G/DL (ref 12–16)
HGB UR QL STRIP: NEGATIVE
IMM GRANULOCYTES # BLD AUTO: 0.05 X10(3)/MCL (ref 0–0.04)
IMM GRANULOCYTES NFR BLD AUTO: 0.5 %
KETONES UR QL STRIP: NEGATIVE
LEUKOCYTE ESTERASE UR QL STRIP: NEGATIVE
LYMPHOCYTES # BLD AUTO: 1.17 X10(3)/MCL (ref 0.6–4.6)
LYMPHOCYTES NFR BLD AUTO: 11 %
MCH RBC QN AUTO: 28.7 PG (ref 27–31)
MCHC RBC AUTO-ENTMCNC: 32.8 G/DL (ref 33–36)
MCV RBC AUTO: 87.5 FL (ref 80–94)
MONOCYTES # BLD AUTO: 0.65 X10(3)/MCL (ref 0.1–1.3)
MONOCYTES NFR BLD AUTO: 6.1 %
MUCOUS THREADS URNS QL MICRO: ABNORMAL /LPF
NEUTROPHILS # BLD AUTO: 8.74 X10(3)/MCL (ref 2.1–9.2)
NEUTROPHILS NFR BLD AUTO: 81.7 %
NITRITE UR QL STRIP: NEGATIVE
OPIATES UR QL SCN: NEGATIVE
PCP UR QL: NEGATIVE
PH UR STRIP: 5.5 [PH]
PH UR: 5.5 [PH] (ref 3–11)
PLATELET # BLD AUTO: 227 X10(3)/MCL (ref 130–400)
PMV BLD AUTO: 9.6 FL (ref 7.4–10.4)
POTASSIUM SERPL-SCNC: 3.6 MMOL/L (ref 3.5–5.1)
PROT SERPL-MCNC: 6.7 GM/DL (ref 6.4–8.3)
PROT UR QL STRIP: 100
RBC # BLD AUTO: 4.39 X10(6)/MCL (ref 4.2–5.4)
RBC #/AREA URNS AUTO: ABNORMAL /HPF
SARS-COV-2 RNA RESP QL NAA+PROBE: NOT DETECTED
SODIUM SERPL-SCNC: 137 MMOL/L (ref 136–145)
SP GR UR STRIP.AUTO: >=1.03 (ref 1–1.03)
SPECIFIC GRAVITY, URINE AUTO (.000) (OHS): >=1.03 (ref 1–1.03)
SQUAMOUS #/AREA URNS AUTO: ABNORMAL /HPF
TSH SERPL-ACNC: 2.77 UIU/ML (ref 0.35–4.94)
UROBILINOGEN UR STRIP-ACNC: 0.2
WBC # BLD AUTO: 10.68 X10(3)/MCL (ref 4.5–11.5)
WBC #/AREA URNS AUTO: ABNORMAL /HPF

## 2024-06-15 PROCEDURE — 85025 COMPLETE CBC W/AUTO DIFF WBC: CPT | Performed by: SPECIALIST

## 2024-06-15 PROCEDURE — 80143 DRUG ASSAY ACETAMINOPHEN: CPT | Performed by: SPECIALIST

## 2024-06-15 PROCEDURE — 80053 COMPREHEN METABOLIC PANEL: CPT | Performed by: SPECIALIST

## 2024-06-15 PROCEDURE — 84443 ASSAY THYROID STIM HORMONE: CPT | Performed by: SPECIALIST

## 2024-06-15 PROCEDURE — 82077 ASSAY SPEC XCP UR&BREATH IA: CPT | Performed by: SPECIALIST

## 2024-06-15 PROCEDURE — 99285 EMERGENCY DEPT VISIT HI MDM: CPT

## 2024-06-15 PROCEDURE — 80307 DRUG TEST PRSMV CHEM ANLYZR: CPT | Performed by: SPECIALIST

## 2024-06-15 PROCEDURE — 25000003 PHARM REV CODE 250: Performed by: SPECIALIST

## 2024-06-15 PROCEDURE — 0240U COVID/FLU A&B PCR: CPT | Performed by: SPECIALIST

## 2024-06-15 PROCEDURE — 81003 URINALYSIS AUTO W/O SCOPE: CPT | Performed by: SPECIALIST

## 2024-06-15 PROCEDURE — 81025 URINE PREGNANCY TEST: CPT | Performed by: SPECIALIST

## 2024-06-15 RX ORDER — LORAZEPAM 0.5 MG/1
1 TABLET ORAL
Status: COMPLETED | OUTPATIENT
Start: 2024-06-15 | End: 2024-06-15

## 2024-06-15 RX ADMIN — LORAZEPAM 1 MG: 0.5 TABLET ORAL at 12:06

## 2024-06-15 NOTE — ED NOTES
Called Carmen of [facility] for report, charge RN reports does not see pt as incoming pt with acceptance, stated she will call back when receives update.
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
## 2024-06-15 NOTE — ED NOTES
Accepting at [facility], [facility] physician Sal Barcenas, [phone] call for report.

## 2024-06-15 NOTE — ED NOTES
Gave report to Christelle, states she needs updated set of vitals within 15 min of ED discharge, stated I will obtain vital set for her.

## 2025-01-31 ENCOUNTER — HOSPITAL ENCOUNTER (EMERGENCY)
Facility: HOSPITAL | Age: 20
Discharge: PSYCHIATRIC OR SUBSTANCE USE FACILITY | End: 2025-01-31
Attending: STUDENT IN AN ORGANIZED HEALTH CARE EDUCATION/TRAINING PROGRAM
Payer: MEDICAID

## 2025-01-31 VITALS
DIASTOLIC BLOOD PRESSURE: 74 MMHG | OXYGEN SATURATION: 100 % | HEART RATE: 88 BPM | SYSTOLIC BLOOD PRESSURE: 115 MMHG | TEMPERATURE: 99 F | RESPIRATION RATE: 20 BRPM

## 2025-01-31 DIAGNOSIS — Z00.8 MEDICAL CLEARANCE FOR PSYCHIATRIC ADMISSION: ICD-10-CM

## 2025-01-31 DIAGNOSIS — R45.851 SUICIDAL IDEATION: ICD-10-CM

## 2025-01-31 DIAGNOSIS — F29 PSYCHOSIS, UNSPECIFIED PSYCHOSIS TYPE: Primary | ICD-10-CM

## 2025-01-31 DIAGNOSIS — F23 ACUTE PSYCHOSIS: ICD-10-CM

## 2025-01-31 LAB
ACCEPTIBLE SP GR UR QL: 1 (ref 1–1.03)
ALBUMIN SERPL-MCNC: 4.1 G/DL (ref 3.5–5)
ALBUMIN/GLOB SERPL: 1.1 RATIO (ref 1.1–2)
ALP SERPL-CCNC: 122 UNIT/L (ref 40–150)
ALT SERPL-CCNC: 61 UNIT/L (ref 0–55)
AMPHET UR QL SCN: NEGATIVE
ANION GAP SERPL CALC-SCNC: 9 MEQ/L
APAP SERPL-MCNC: <3 UG/ML (ref 10–30)
AST SERPL-CCNC: 41 UNIT/L (ref 5–34)
B-HCG UR QL: NEGATIVE
BACTERIA #/AREA URNS AUTO: ABNORMAL /HPF
BARBITURATE SCN PRESENT UR: NEGATIVE
BASOPHILS # BLD AUTO: 0.06 X10(3)/MCL
BASOPHILS NFR BLD AUTO: 0.9 %
BENZODIAZ UR QL SCN: NEGATIVE
BILIRUB SERPL-MCNC: 0.2 MG/DL
BILIRUB UR QL STRIP.AUTO: NEGATIVE
BUN SERPL-MCNC: 9.4 MG/DL (ref 7–18.7)
CALCIUM SERPL-MCNC: 9.7 MG/DL (ref 8.4–10.2)
CANNABINOIDS UR QL SCN: NEGATIVE
CHLORIDE SERPL-SCNC: 107 MMOL/L (ref 98–107)
CLARITY UR: CLEAR
CO2 SERPL-SCNC: 21 MMOL/L (ref 22–29)
COCAINE UR QL SCN: NEGATIVE
COLOR UR AUTO: COLORLESS
CREAT SERPL-MCNC: 0.75 MG/DL (ref 0.55–1.02)
CREAT/UREA NIT SERPL: 13
EOSINOPHIL # BLD AUTO: 0.22 X10(3)/MCL (ref 0–0.9)
EOSINOPHIL NFR BLD AUTO: 3.2 %
ERYTHROCYTE [DISTWIDTH] IN BLOOD BY AUTOMATED COUNT: 12.9 % (ref 11.5–17)
ETHANOL SERPL-MCNC: <10 MG/DL
FENTANYL UR QL SCN: NEGATIVE
GFR SERPLBLD CREATININE-BSD FMLA CKD-EPI: >60 ML/MIN/1.73/M2
GLOBULIN SER-MCNC: 3.6 GM/DL (ref 2.4–3.5)
GLUCOSE SERPL-MCNC: 93 MG/DL (ref 74–100)
GLUCOSE UR QL STRIP: NORMAL
HCT VFR BLD AUTO: 40.3 % (ref 37–47)
HGB BLD-MCNC: 13.3 G/DL (ref 12–16)
HGB UR QL STRIP: NEGATIVE
IMM GRANULOCYTES # BLD AUTO: 0.04 X10(3)/MCL (ref 0–0.04)
IMM GRANULOCYTES NFR BLD AUTO: 0.6 %
KETONES UR QL STRIP: NEGATIVE
LEUKOCYTE ESTERASE UR QL STRIP: NEGATIVE
LITHIUM SERPL-MCNC: 0.65 MMOL/L (ref 0.5–1.2)
LYMPHOCYTES # BLD AUTO: 2.41 X10(3)/MCL (ref 0.6–4.6)
LYMPHOCYTES NFR BLD AUTO: 35.2 %
MCH RBC QN AUTO: 28.9 PG (ref 27–31)
MCHC RBC AUTO-ENTMCNC: 33 G/DL (ref 33–36)
MCV RBC AUTO: 87.4 FL (ref 80–94)
MDMA UR QL SCN: NEGATIVE
MONOCYTES # BLD AUTO: 0.66 X10(3)/MCL (ref 0.1–1.3)
MONOCYTES NFR BLD AUTO: 9.6 %
NEUTROPHILS # BLD AUTO: 3.45 X10(3)/MCL (ref 2.1–9.2)
NEUTROPHILS NFR BLD AUTO: 50.5 %
NITRITE UR QL STRIP: NEGATIVE
NRBC BLD AUTO-RTO: 0 %
OPIATES UR QL SCN: NEGATIVE
PCP UR QL: NEGATIVE
PH UR STRIP: 7 [PH]
PH UR: 7 [PH] (ref 3–11)
PLATELET # BLD AUTO: 226 X10(3)/MCL (ref 130–400)
PMV BLD AUTO: 9.8 FL (ref 7.4–10.4)
POTASSIUM SERPL-SCNC: 4.6 MMOL/L (ref 3.5–5.1)
PROT SERPL-MCNC: 7.7 GM/DL (ref 6.4–8.3)
PROT UR QL STRIP: NEGATIVE
RBC # BLD AUTO: 4.61 X10(6)/MCL (ref 4.2–5.4)
RBC #/AREA URNS AUTO: ABNORMAL /HPF
SODIUM SERPL-SCNC: 137 MMOL/L (ref 136–145)
SP GR UR STRIP.AUTO: 1 (ref 1–1.03)
SQUAMOUS #/AREA URNS LPF: ABNORMAL /HPF
UROBILINOGEN UR STRIP-ACNC: NORMAL
WBC # BLD AUTO: 6.84 X10(3)/MCL (ref 4.5–11.5)
WBC #/AREA URNS AUTO: ABNORMAL /HPF

## 2025-01-31 PROCEDURE — 85025 COMPLETE CBC W/AUTO DIFF WBC: CPT | Performed by: PHYSICIAN ASSISTANT

## 2025-01-31 PROCEDURE — 80143 DRUG ASSAY ACETAMINOPHEN: CPT | Performed by: PHYSICIAN ASSISTANT

## 2025-01-31 PROCEDURE — 80053 COMPREHEN METABOLIC PANEL: CPT | Performed by: PHYSICIAN ASSISTANT

## 2025-01-31 PROCEDURE — 81025 URINE PREGNANCY TEST: CPT | Performed by: PHYSICIAN ASSISTANT

## 2025-01-31 PROCEDURE — 63600175 PHARM REV CODE 636 W HCPCS: Performed by: STUDENT IN AN ORGANIZED HEALTH CARE EDUCATION/TRAINING PROGRAM

## 2025-01-31 PROCEDURE — 81001 URINALYSIS AUTO W/SCOPE: CPT | Performed by: PHYSICIAN ASSISTANT

## 2025-01-31 PROCEDURE — 82077 ASSAY SPEC XCP UR&BREATH IA: CPT | Performed by: PHYSICIAN ASSISTANT

## 2025-01-31 PROCEDURE — 80307 DRUG TEST PRSMV CHEM ANLYZR: CPT | Performed by: PHYSICIAN ASSISTANT

## 2025-01-31 PROCEDURE — 80178 ASSAY OF LITHIUM: CPT | Performed by: STUDENT IN AN ORGANIZED HEALTH CARE EDUCATION/TRAINING PROGRAM

## 2025-01-31 PROCEDURE — 96372 THER/PROPH/DIAG INJ SC/IM: CPT | Performed by: STUDENT IN AN ORGANIZED HEALTH CARE EDUCATION/TRAINING PROGRAM

## 2025-01-31 PROCEDURE — 99285 EMERGENCY DEPT VISIT HI MDM: CPT | Mod: 25

## 2025-01-31 PROCEDURE — 25000003 PHARM REV CODE 250: Performed by: STUDENT IN AN ORGANIZED HEALTH CARE EDUCATION/TRAINING PROGRAM

## 2025-01-31 RX ORDER — HALOPERIDOL 5 MG/ML
5 INJECTION INTRAMUSCULAR
Status: COMPLETED | OUTPATIENT
Start: 2025-01-31 | End: 2025-01-31

## 2025-01-31 RX ORDER — LISINOPRIL 10 MG/1
10 TABLET ORAL
COMMUNITY
Start: 2025-01-10

## 2025-01-31 RX ORDER — DIPHENHYDRAMINE HYDROCHLORIDE 50 MG/ML
50 INJECTION INTRAMUSCULAR; INTRAVENOUS
Status: COMPLETED | OUTPATIENT
Start: 2025-01-31 | End: 2025-01-31

## 2025-01-31 RX ORDER — METOPROLOL SUCCINATE 25 MG/1
25 TABLET, EXTENDED RELEASE ORAL DAILY
Status: DISCONTINUED | OUTPATIENT
Start: 2025-01-31 | End: 2025-01-31 | Stop reason: HOSPADM

## 2025-01-31 RX ORDER — HYDROXYZINE PAMOATE 25 MG/1
25 CAPSULE ORAL 3 TIMES DAILY
COMMUNITY
Start: 2025-01-30

## 2025-01-31 RX ORDER — FLUOXETINE HYDROCHLORIDE 40 MG/1
40 CAPSULE ORAL
COMMUNITY
Start: 2025-01-20

## 2025-01-31 RX ORDER — DOXEPIN HYDROCHLORIDE 10 MG/1
10 CAPSULE ORAL NIGHTLY
Status: DISCONTINUED | OUTPATIENT
Start: 2025-01-31 | End: 2025-01-31 | Stop reason: HOSPADM

## 2025-01-31 RX ORDER — RISPERIDONE 1 MG/1
1 TABLET ORAL 2 TIMES DAILY
COMMUNITY
Start: 2025-01-17

## 2025-01-31 RX ORDER — LAMOTRIGINE 100 MG/1
100 TABLET ORAL DAILY
Status: DISCONTINUED | OUTPATIENT
Start: 2025-01-31 | End: 2025-01-31 | Stop reason: HOSPADM

## 2025-01-31 RX ORDER — LORAZEPAM 1 MG/1
1 TABLET ORAL
Status: COMPLETED | OUTPATIENT
Start: 2025-01-31 | End: 2025-01-31

## 2025-01-31 RX ADMIN — METOPROLOL SUCCINATE 25 MG: 25 TABLET, EXTENDED RELEASE ORAL at 11:01

## 2025-01-31 RX ADMIN — HALOPERIDOL LACTATE 5 MG: 5 INJECTION, SOLUTION INTRAMUSCULAR at 11:01

## 2025-01-31 RX ADMIN — DIPHENHYDRAMINE HYDROCHLORIDE 50 MG: 50 INJECTION INTRAMUSCULAR; INTRAVENOUS at 11:01

## 2025-01-31 RX ADMIN — LORAZEPAM 1 MG: 1 TABLET ORAL at 10:01

## 2025-01-31 RX ADMIN — LITHIUM CARBONATE 450 MG: 300 CAPSULE, GELATIN COATED ORAL at 11:01

## 2025-01-31 RX ADMIN — LAMOTRIGINE 100 MG: 100 TABLET ORAL at 11:01

## 2025-01-31 NOTE — ED NOTES
"pt hollering and screaming saying I'm going to kill you. open the door and ask pt who she is going to kill. pt states," I am going to kill you. " As she contiune to lay in bed. Redirected patient to stop  screaming.   "

## 2025-01-31 NOTE — ED PROVIDER NOTES
Encounter Date: 1/31/2025    SCRIBE #1 NOTE: I, Wili Alvarado, am scribing for, and in the presence of,  Feliciano Mckoy MD. I have scribed the following portions of the note - Other sections scribed: HPI, ROS, and PE.       History     Chief Complaint   Patient presents with    Hallucinations     Reports auditory hallucinations & increased anxiety. Denies SI/HI & visual hallucinations.      Hernando Alvarado is a 19 y.o. female patient with a PMHx of ADHD, anxiety, bipolar disorder, depression, HTN, and schizoaffective disorder who presents to the Emergency Department for evaluation of auditory hallucinations and anxiety. She reports taking anxiety medication yesterday and today, but states it does not help. She also mentions having a verbal altercation with her mother last weekend, cursing her out for no reason. She says that she lives with her mom and mom's boyfriends family. When asked about her auditory hallucinations she states that her dog went missing and she misses him, misses her boyfriend, and mother.  Patient then states she is going to kill herself and all of us.  Appears to be return responding to internal stimuli.  Reports hallucinations.    She is requesting to call her mother to let her know where she is and to tell her the anxiety meds aren't enough.       The history is provided by the patient. No  was used.     Review of patient's allergies indicates:  No Known Allergies  Past Medical History:   Diagnosis Date    ADHD     Depression     History of psychiatric hospitalization     Hypertension     Mitral valve regurgitation      No past surgical history on file.  Family History   Problem Relation Name Age of Onset    Diabetes Mellitus Mother      Hypertension Mother      Heart attack Paternal Grandfather       Social History     Tobacco Use    Smoking status: Never    Smokeless tobacco: Never   Substance Use Topics    Alcohol use: Never    Drug use: Never     Review of Systems    Constitutional:  Negative for fever.   HENT:  Negative for sore throat.    Eyes:  Negative for visual disturbance.   Respiratory:  Negative for shortness of breath.    Cardiovascular:  Positive for chest pain.   Gastrointestinal:  Negative for abdominal pain.   Genitourinary:  Negative for dysuria.   Musculoskeletal:  Negative for joint swelling.   Skin:  Negative for rash.   Neurological:  Negative for weakness.   Psychiatric/Behavioral:  Positive for dysphoric mood and hallucinations. Negative for confusion and suicidal ideas. The patient is nervous/anxious.        Physical Exam     Initial Vitals [01/31/25 0952]   BP Pulse Resp Temp SpO2   127/87 (!) 112 18 98.1 °F (36.7 °C) 98 %      MAP       --         Physical Exam    Nursing note and vitals reviewed.  Constitutional: She appears well-developed and well-nourished.   HENT:   Head: Normocephalic and atraumatic.   Eyes: EOM are normal. Pupils are equal, round, and reactive to light.   Neck:   Normal range of motion.  Cardiovascular:  Normal rate, regular rhythm, normal heart sounds and intact distal pulses.           No murmur heard.  Pulmonary/Chest: Breath sounds normal. No respiratory distress. She has no wheezes. She has no rales.   Abdominal: Abdomen is soft. She exhibits no distension. There is no abdominal tenderness. There is no rebound.   Musculoskeletal:         General: No tenderness or edema. Normal range of motion.      Cervical back: Normal range of motion.     Neurological: She is alert. She has normal strength. No cranial nerve deficit. GCS score is 15. GCS eye subscore is 4. GCS verbal subscore is 5. GCS motor subscore is 6.   Skin: Skin is warm and dry. Capillary refill takes less than 2 seconds. No rash noted. No erythema.   Psychiatric: Her affect is labile. Her speech is tangential. She expresses homicidal and suicidal ideation. She expresses suicidal plans and homicidal plans.   Flight of ideas.Hallucinations.          ED Course    Procedures  Labs Reviewed   COMPREHENSIVE METABOLIC PANEL - Abnormal       Result Value    Sodium 137      Potassium 4.6      Chloride 107      CO2 21 (*)     Glucose 93      Blood Urea Nitrogen 9.4      Creatinine 0.75      Calcium 9.7      Protein Total 7.7      Albumin 4.1      Globulin 3.6 (*)     Albumin/Globulin Ratio 1.1      Bilirubin Total 0.2            ALT 61 (*)     AST 41 (*)     eGFR >60      Anion Gap 9.0      BUN/Creatinine Ratio 13     URINALYSIS, REFLEX TO URINE CULTURE - Abnormal    Color, UA Colorless      Appearance, UA Clear      Specific Gravity, UA 1.002 (*)     pH, UA 7.0      Protein, UA Negative      Glucose, UA Normal      Ketones, UA Negative      Blood, UA Negative      Bilirubin, UA Negative      Urobilinogen, UA Normal      Nitrites, UA Negative      Leukocyte Esterase, UA Negative      RBC, UA None Seen      WBC, UA 0-5      Bacteria, UA None Seen      Squamous Epithelial Cells, UA Trace     ACETAMINOPHEN LEVEL - Abnormal    Acetaminophen Level <3.0 (*)    DRUG SCREEN, URINE (BEAKER) - Normal    Amphetamines, Urine Negative      Barbiturates, Urine Negative      Benzodiazepine, Urine Negative      Cannabinoids, Urine Negative      Cocaine, Urine Negative      Fentanyl, Urine Negative      MDMA, Urine Negative      Opiates, Urine Negative      Phencyclidine, Urine Negative      pH, Urine 7.0      Specific Gravity, Urine Auto 1.002      Narrative:     Cut off concentrations:    Amphetamines - 1000 ng/ml  Barbiturates - 200 ng/ml  Benzodiazepine - 200 ng/ml  Cannabinoids (THC) - 50 ng/ml  Cocaine - 300 ng/ml  Fentanyl - 1.0 ng/ml  MDMA - 500 ng/ml  Opiates - 300 ng/ml   Phencyclidine (PCP) - 25 ng/ml    Specimen submitted for drug analysis and tested for pH and specific gravity in order to evaluate sample integrity. Suspect tampering if specific gravity is <1.003 and/or pH is not within the range of 4.5 - 8.0  False negatives may result form substances such as bleach added to  urine.  False positives may result for the presence of a substance with similar chemical structure to the drug or its metabolite.    This test provides only a PRELIMINARY analytical test result. A more specific alternate chemical method must be used in order to obtain a confirmed analytical result. Gas chromatography/mass spectrometry (GC/MS) is the preferred confirmatory method. Other chemical confirmation methods are available. Clinical consideration and professional judgement should be applied to any drug of abuse test result, particularly when preliminary positive results are used.    Positive results will be confirmed only at the physicians request. Unconfirmed screening results are to be used only for medical purposes (treatment).        ALCOHOL,MEDICAL (ETHANOL) - Normal    Ethanol Level <10.0     PREGNANCY TEST, URINE RAPID - Normal    hCG Qualitative, Urine Negative     LITHIUM LEVEL - Normal    Lithium Level 0.65     CBC W/ AUTO DIFFERENTIAL    Narrative:     The following orders were created for panel order CBC auto differential.  Procedure                               Abnormality         Status                     ---------                               -----------         ------                     CBC with Differential[[id number]]                           Final result                 Please view results for these tests on the individual orders.   CBC WITH DIFFERENTIAL    WBC 6.84      RBC 4.61      Hgb 13.3      Hct 40.3      MCV 87.4      MCH 28.9      MCHC 33.0      RDW 12.9      Platelet 226      MPV 9.8      Neut % 50.5      Lymph % 35.2      Mono % 9.6      Eos % 3.2      Basophil % 0.9      Imm Grans % 0.6      Neut # 3.45      Lymph # 2.41      Mono # 0.66      Eos # 0.22      Baso # 0.06      Imm Gran # 0.04      NRBC% 0.0            Imaging Results    None          Medications   doxepin capsule 10 mg (has no administration in time range)   lamoTRIgine tablet 100 mg (100 mg Oral Given 1/31/25  1130)   lithium carbonate capsule 450 mg (450 mg Oral Given 1/31/25 1141)   metoprolol succinate (TOPROL-XL) 24 hr tablet 25 mg (25 mg Oral Given 1/31/25 1129)   LORazepam tablet 1 mg (1 mg Oral Given 1/31/25 1024)   haloperidol lactate injection 5 mg (5 mg Intramuscular Given 1/31/25 1131)   diphenhydrAMINE injection 50 mg (50 mg Intramuscular Given 1/31/25 1131)     Medical Decision Making  suicidal ideations, homicidal ideations, auditory or visual hallucinations, drug/etoh intoxication, bipolar disorder, schizophrenia, schizoaffective, delusional disorder, major depressive disorder, PTSD, substance induced mood disorder, violent behavior, suicidal attempt, non-psychiatric medical illness, malingering      Patient is a 19-year-old female presents to the emergency department for mental health evaluation.  She appears to be acutely psychotic.  Active hallucinations.  Responding to internal stimuli.  Flight of ideas.  Tangential speech.  Threatening to harm staff.  As a result given Ativan.  Will place under pec.  Medically cleared and hemodynamically stable for continued psychiatric evaluation.    Problems Addressed:  Acute psychosis: acute illness or injury that poses a threat to life or bodily functions  Medical clearance for psychiatric admission: acute illness or injury that poses a threat to life or bodily functions  Psychosis, unspecified psychosis type: acute illness or injury that poses a threat to life or bodily functions  Suicidal ideation: acute illness or injury that poses a threat to life or bodily functions    Amount and/or Complexity of Data Reviewed  External Data Reviewed: notes.  Labs: ordered.    Risk  Prescription drug management.  Parenteral controlled substances.  Decision regarding hospitalization.  Diagnosis or treatment significantly limited by social determinants of health.            Scribe Attestation:   Scribe #1: I performed the above scribed service and the documentation accurately  describes the services I performed. I attest to the accuracy of the note.    Attending Attestation:           Physician Attestation for Scribe:  Physician Attestation Statement for Scribe #1: I, Feliciano Mckoy MD, reviewed documentation, as scribed by Wili Alvarado in my presence, and it is both accurate and complete.             ED Course as of 01/31/25 1215   Fri Jan 31, 2025   1100 Per nurse, patient is now screaming and hollering that she will kill them.   PEC placed at this time. [GG]      ED Course User Index  [GG] Wili Alvarado       Medically cleared for psychiatry placement: 1/31/2025 11:18 AM                   Clinical Impression:  Final diagnoses:  [F29] Psychosis, unspecified psychosis type (Primary)  [F23] Acute psychosis  [R45.851] Suicidal ideation  [Z00.8] Medical clearance for psychiatric admission          ED Disposition Condition    Transfer to Psych Facility Stable          ED Prescriptions    None       Follow-up Information    None          Feliciano Mckoy MD  01/31/25 1151       Feliciano Mckoy MD  01/31/25 1215

## 2025-01-31 NOTE — FIRST PROVIDER EVALUATION
"Medical screening examination initiated.  I have conducted a focused provider triage encounter, findings are as follows:    Brief history of present illness:  19yoWF w/anxiety and hearing voices, pressured speech and HTN. Denies SI and HI. Lives in a group home. Hx of recurrent mental disturbances per patient.     Vitals:    01/31/25 0952   BP: 127/87   Pulse: (!) 112   Resp: 18   Temp: 98.1 °F (36.7 °C)   TempSrc: Temporal   SpO2: 98%       Pertinent physical exam:  NAD. Pressured speech. Flight of ideas. Hears "her dog"    Brief workup plan:  labs     Preliminary workup initiated; this workup will be continued and followed by the physician or advanced practice provider that is assigned to the patient when roomed.  "